# Patient Record
Sex: FEMALE | Race: WHITE | NOT HISPANIC OR LATINO | Employment: FULL TIME | URBAN - METROPOLITAN AREA
[De-identification: names, ages, dates, MRNs, and addresses within clinical notes are randomized per-mention and may not be internally consistent; named-entity substitution may affect disease eponyms.]

---

## 2022-11-30 ENCOUNTER — HOSPITAL ENCOUNTER (EMERGENCY)
Facility: CLINIC | Age: 27
Discharge: HOME | End: 2022-11-30
Attending: PHYSICIAN ASSISTANT | Admitting: PHYSICIAN ASSISTANT
Payer: COMMERCIAL

## 2022-11-30 VITALS
SYSTOLIC BLOOD PRESSURE: 124 MMHG | HEART RATE: 72 BPM | RESPIRATION RATE: 16 BRPM | TEMPERATURE: 97.4 F | DIASTOLIC BLOOD PRESSURE: 78 MMHG | OXYGEN SATURATION: 100 %

## 2022-11-30 DIAGNOSIS — J06.9 UPPER RESPIRATORY TRACT INFECTION, UNSPECIFIED TYPE: ICD-10-CM

## 2022-11-30 LAB
FLUAV RNA SPEC QL NAA+PROBE: NEGATIVE
FLUBV RNA RESP QL NAA+PROBE: NEGATIVE
RSV RNA SPEC NAA+PROBE: NEGATIVE
SARS-COV-2 RNA RESP QL NAA+PROBE: NEGATIVE

## 2022-11-30 PROCEDURE — C9803 HOPD COVID-19 SPEC COLLECT: HCPCS

## 2022-11-30 PROCEDURE — 87637 SARSCOV2&INF A&B&RSV AMP PRB: CPT | Performed by: PHYSICIAN ASSISTANT

## 2022-11-30 PROCEDURE — 99283 EMERGENCY DEPT VISIT LOW MDM: CPT | Mod: CS

## 2022-11-30 PROCEDURE — 250N000013 HC RX MED GY IP 250 OP 250 PS 637: Performed by: PHYSICIAN ASSISTANT

## 2022-11-30 RX ORDER — ACETAMINOPHEN 500 MG
1000 TABLET ORAL EVERY 4 HOURS PRN
Status: DISCONTINUED | OUTPATIENT
Start: 2022-11-30 | End: 2022-12-01 | Stop reason: HOSPADM

## 2022-11-30 RX ADMIN — ACETAMINOPHEN 1000 MG: 500 TABLET ORAL at 22:55

## 2022-11-30 ASSESSMENT — ENCOUNTER SYMPTOMS
HEADACHES: 1
COUGH: 1
MYALGIAS: 1
FEVER: 1
APPETITE CHANGE: 1

## 2022-11-30 ASSESSMENT — ACTIVITIES OF DAILY LIVING (ADL): ADLS_ACUITY_SCORE: 35

## 2022-11-30 NOTE — Clinical Note
Alycia Noe was seen and treated in our emergency department on 11/30/2022.  She may return to work on 12/02/2022.       If you have any questions or concerns, please don't hesitate to call.      Noreen Wilson PA-C

## 2022-12-01 NOTE — DISCHARGE INSTRUCTIONS

## 2022-12-01 NOTE — ED PROVIDER NOTES
History     Chief Complaint:  Flu Symptoms       HPI   Alycia Noe is a 27 year old female with no significant PMH who presents to ED for evaluation of fever, cough, body aches, headache, loss of smell and voice. Symptoms started yesterday. Patient denies sick contacts. She has not taken any medication before coming to ED. Patient is eating less than usual but is drinking fluids and using bathroom regularly. Patient is not vaccinated against Covid19 or influenza. No vomiting, diarrhea, or rash.    ROS:  Review of Systems   Constitutional: Positive for appetite change and fever.   HENT:        Anosmia   Respiratory: Positive for cough.    Musculoskeletal: Positive for myalgias.   Neurological: Positive for headaches.   All other systems reviewed and are negative.    Allergies:  No Known Allergies     Medications:    No current outpatient medications on file.      Past Medical History:    History reviewed. No pertinent past medical history.    Past Surgical History:    History reviewed. No pertinent surgical history.     Family History:    family history is not on file.    Social History:     PCP: No primary care provider on file.     Physical Exam     Patient Vitals for the past 24 hrs:   BP Temp Temp src Pulse Resp SpO2   11/30/22 2148 (!) 125/94 -- -- 71 -- 100 %   11/30/22 2035 115/84 97.4  F (36.3  C) Temporal 72 16 100 %        Physical Exam  Constitutional: Alert, attentive, GCS 15  HENT:    Nose: Nasal congestion.   Mouth/Throat: Oropharynx is clear, mucous membranes are moist   Eyes: EOM are normal.   CV: regular rate and rhythm; no murmurs, rubs or gallups  Chest: Effort normal and breath sounds normal. No wheezing or crackles.  GI:  There is no tenderness. No distension. Normal bowel sounds  MSK: Normal range of motion.   Neurological: Alert, attentive  Skin: Skin is warm and dry.      Emergency Department Course     Laboratory:  Labs Ordered and Resulted from Time of ED Arrival to Time of ED Departure    INFLUENZA A/B & SARS-COV2 PCR MULTIPLEX - Normal       Result Value    Influenza A PCR Negative      Influenza B PCR Negative      RSV PCR Negative      SARS CoV2 PCR Negative          Emergency Department Course:    Reviewed:  I reviewed nursing notes, vitals, past medical history and [facility]    Assessments:  2235 I obtained history and examined the patient as noted above. Laboratory results discussed.    Interventions:  Medications   acetaminophen (TYLENOL) tablet 1,000 mg (has no administration in time range)        Disposition:  The patient was discharged to home.     Impression & Plan    CMS Diagnoses: None  Medical Decision Making:  Patient is a 28 yo F who presents for evaluation of fever, cough, body aches, nasal congestion, and anosmia which started yesterday. Viral swab is negative. Her symptoms are consistent with an upper respiratory tract infection. There are no signs at this point of other serious bacterial infection such as OM, RPA, epiglottitis, PTA, strep pharyngitis, pneumonia, sinusitis, meningitis, bacteremia. Given clear lungs, fever curve, no hypoxia and no respiratory distress I do not feel patient needs a CXR at this point as the probability of bacterial pneumonia is very unlikely. There are no concerning gastrointestinal symptoms at this point and no signs of dehydration. Close followup with primary care physician is indicated. Return to UR/ED for fever > 103, protracted vomiting, confusion. Patient expressed understanding of plan and was ready for discharge.    Diagnosis:    ICD-10-CM    1. Upper respiratory tract infection, unspecified type  J06.9            Discharge Medications:  New Prescriptions    No medications on file        11/30/2022   Noreen Wilson PA-C Steinbrueck, Emily, PA-C  11/30/22 3650

## 2022-12-01 NOTE — ED TRIAGE NOTES
Pt presents with fever, cough, body aches, headaches, loss of smell and voice. Sx started yesterday.      Triage Assessment     Row Name 11/30/22 2034       Triage Assessment (Adult)    Airway WDL WDL       Respiratory WDL    Respiratory WDL WDL       Skin Circulation/Temperature WDL    Skin Circulation/Temperature WDL WDL       Cardiac WDL    Cardiac WDL WDL       Peripheral/Neurovascular WDL    Peripheral Neurovascular WDL WDL       Cognitive/Neuro/Behavioral WDL    Cognitive/Neuro/Behavioral WDL WDL

## 2023-06-14 ENCOUNTER — HOSPITAL ENCOUNTER (EMERGENCY)
Facility: CLINIC | Age: 28
Discharge: HOME | End: 2023-06-14
Attending: EMERGENCY MEDICINE | Admitting: EMERGENCY MEDICINE
Payer: COMMERCIAL

## 2023-06-14 VITALS
RESPIRATION RATE: 18 BRPM | HEIGHT: 66 IN | BODY MASS INDEX: 31.43 KG/M2 | SYSTOLIC BLOOD PRESSURE: 121 MMHG | HEART RATE: 86 BPM | OXYGEN SATURATION: 100 % | TEMPERATURE: 98 F | WEIGHT: 195.6 LBS | DIASTOLIC BLOOD PRESSURE: 79 MMHG

## 2023-06-14 DIAGNOSIS — G44.219 EPISODIC TENSION-TYPE HEADACHE, NOT INTRACTABLE: ICD-10-CM

## 2023-06-14 LAB — GLUCOSE BLDC GLUCOMTR-MCNC: 90 MG/DL (ref 70–99)

## 2023-06-14 PROCEDURE — 82962 GLUCOSE BLOOD TEST: CPT

## 2023-06-14 PROCEDURE — 99283 EMERGENCY DEPT VISIT LOW MDM: CPT | Performed by: EMERGENCY MEDICINE

## 2023-06-14 PROCEDURE — 250N000013 HC RX MED GY IP 250 OP 250 PS 637: Performed by: EMERGENCY MEDICINE

## 2023-06-14 RX ORDER — IBUPROFEN 600 MG/1
600 TABLET, FILM COATED ORAL ONCE
Status: COMPLETED | OUTPATIENT
Start: 2023-06-14 | End: 2023-06-14

## 2023-06-14 RX ADMIN — IBUPROFEN 600 MG: 600 TABLET, FILM COATED ORAL at 05:42

## 2023-06-14 NOTE — ED TRIAGE NOTES
"Patient sad she has been having intermittent headache for almost a month now. She said headache was getting worse that she was unable to sleep.  She also reported \"slight\" lightheadedness. Patient took Tylenol 500 mg around 0351.     Triage Assessment     Row Name 06/14/23 8651       Triage Assessment (Adult)    Airway WDL WDL       Respiratory WDL    Respiratory WDL WDL       Skin Circulation/Temperature WDL    Skin Circulation/Temperature WDL WDL       Cardiac WDL    Cardiac WDL WDL       Peripheral/Neurovascular WDL    Peripheral Neurovascular WDL WDL       Cognitive/Neuro/Behavioral WDL    Cognitive/Neuro/Behavioral WDL WDL              "

## 2023-06-14 NOTE — DISCHARGE INSTRUCTIONS
Please make an appointment to [facility] with [facility] (phone: [phone]) in 2-3 days if you have any concerns.    Return to the emergency department if you have any further concerns    If Alycia has discomfort from fever or other pain, she can have:  Acetaminophen (Tylenol) every 6 hours as needed. Her dose is:    2 regular strength tabs (650 mg)                                     (43.2+ kg/96+ lb)    NOTE: If your acetaminophen (Tylenol) came with a dropper marked with 0.4 and 0.8 ml, call us ([phone]) or check with your doctor about the dose before using it.     AND/OR      Ibuprofen (Advil, Motrin) every 6 hours as needed. His/her dose is:    2 regular strength tabs (400 mg)                                                                         (40-60 kg/ lb)

## 2023-06-14 NOTE — ED PROVIDER NOTES
"ED Provider Note  [facility]      History     Chief Complaint   Patient presents with     Headache     HPI  Alycia Noe is a 28 year old female who is presenting with a headache.  She has had every week for the past month.  Changes from the left to the right side.  No trauma.  She is not on blood thinners.  Denies numbness tingling or weakness.  No visual changes.  She has had a little dizziness with the headaches but nothing right now.  No vertigo.  No feeling as if she will pass out.  She has not had trouble ambulating.  No vomiting or visual changes.  Denies neck pain or stiffness.  No fevers or chills.  She is tried Tylenol which does help.  She is wondering if she has low blood sugar.  Headache does not change with position.  It is not worse in the mornings.    Past Medical History  History reviewed. No pertinent past medical history.  History reviewed. No pertinent surgical history.  No current outpatient medications on file.    No Known Allergies  Family History  History reviewed. No pertinent family history.  Social History   Social History     Tobacco Use     Smoking status: Never     Passive exposure: Never     Smokeless tobacco: Never         A medically appropriate review of systems was performed with pertinent positives and negatives noted in the HPI, and all other systems negative.    Physical Exam   BP: 121/79  Pulse: 86  Temp: 98  F (36.7  C)  Resp: 18  Height: 167.6 cm (5' 6\")  Weight: 88.7 kg (195 lb 9.6 oz)  SpO2: 100 %  Physical Exam  Physical Exam   Constitutional: oriented to person, place, and time. appears well-developed and well-nourished.   HENT:   Head: Normocephalic and atraumatic. No tenderness over the temples or cranium.  Neck: Normal range of motion. No tenderness over the C-spine.  Pulmonary/Chest: Effort normal. No respiratory distress.   Cardiac: No murmurs, rubs, gallops. RRR.  Abdominal: Abdomen soft, nontender, nondistended. No rebound " tenderness.  MSK: Long bones without deformity or evidence of trauma  Neurological: alert and oriented to person, place, and time. No nuchal rigidity.  Stable gait.  No focal defects.  Cranial nerves II through XII normal, pupils 3 mm bilaterally.  , bicep, tricep, lower leg and hip strength symmetric.  Sensation gross intact light touch in all extremities.  Skin: Skin is warm and dry.   Psychiatric:  normal mood and affect.  behavior is normal. Thought content normal.       ED Course, Procedures, & Data      Procedures              No results found for any visits on 06/14/23.  Medications   ibuprofen (ADVIL/MOTRIN) tablet 600 mg (has no administration in time range)     Labs Ordered and Resulted from Time of ED Arrival to Time of ED Departure - No data to display  No orders to display          Critical care was not performed.     Medical Decision Making  The patient's presentation was of low complexity (an acute and uncomplicated illness or injury).    The patient's evaluation involved:  ordering and/or review of 1 test(s) in this encounter (see separate area of note for details)    The patient's management necessitated only low risk treatment.      Assessment & Plan    MDM  Patient presenting with occasional headache over the past month.  There are no red flags to suggest mass she has no vomiting, visual changes, no worsening with position change and it is not worse in the mornings.  Symptoms are resolved with Tylenol.  No signs or symptoms of meningitis encephalitis today.  No concern for intracranial hemorrhage, will defer imaging at this point.  Do not feel labs are necessary.  Blood sugar is normal.  Recommended conservative treatment with NSAIDs, Tylenol and follow-up with her primary care provider, she was provided a phone number for this.    I have reviewed the nursing notes. I have reviewed the findings, diagnosis, plan and need for follow up with the patient.    New Prescriptions    No medications on  file       Final diagnoses:   Episodic tension-type headache, not intractable       Elías ZIMMERMAN [facility] EMERGENCY DEPARTMENT  6/14/2023     Elías Breaux MD  06/14/23 0551

## 2023-07-10 ENCOUNTER — APPOINTMENT (OUTPATIENT)
Dept: CT IMAGING | Facility: CLINIC | Age: 28
End: 2023-07-10
Attending: EMERGENCY MEDICINE
Payer: COMMERCIAL

## 2023-07-10 ENCOUNTER — HOSPITAL ENCOUNTER (EMERGENCY)
Facility: CLINIC | Age: 28
Discharge: HOME | End: 2023-07-10
Attending: EMERGENCY MEDICINE | Admitting: EMERGENCY MEDICINE
Payer: COMMERCIAL

## 2023-07-10 VITALS
WEIGHT: 155 LBS | HEART RATE: 83 BPM | TEMPERATURE: 97.8 F | BODY MASS INDEX: 25.02 KG/M2 | OXYGEN SATURATION: 100 % | SYSTOLIC BLOOD PRESSURE: 97 MMHG | RESPIRATION RATE: 16 BRPM | DIASTOLIC BLOOD PRESSURE: 52 MMHG

## 2023-07-10 DIAGNOSIS — M54.2 NECK PAIN: ICD-10-CM

## 2023-07-10 DIAGNOSIS — R51.9 NONINTRACTABLE EPISODIC HEADACHE, UNSPECIFIED HEADACHE TYPE: ICD-10-CM

## 2023-07-10 LAB — HCG UR QL: NEGATIVE

## 2023-07-10 PROCEDURE — 70450 CT HEAD/BRAIN W/O DYE: CPT

## 2023-07-10 PROCEDURE — 250N000013 HC RX MED GY IP 250 OP 250 PS 637: Performed by: EMERGENCY MEDICINE

## 2023-07-10 PROCEDURE — 99284 EMERGENCY DEPT VISIT MOD MDM: CPT | Performed by: EMERGENCY MEDICINE

## 2023-07-10 PROCEDURE — 99284 EMERGENCY DEPT VISIT MOD MDM: CPT | Mod: 25 | Performed by: EMERGENCY MEDICINE

## 2023-07-10 PROCEDURE — 81025 URINE PREGNANCY TEST: CPT | Performed by: EMERGENCY MEDICINE

## 2023-07-10 RX ORDER — IBUPROFEN 600 MG/1
600 TABLET, FILM COATED ORAL ONCE
Status: COMPLETED | OUTPATIENT
Start: 2023-07-10 | End: 2023-07-10

## 2023-07-10 RX ADMIN — IBUPROFEN 600 MG: 600 TABLET, FILM COATED ORAL at 20:10

## 2023-07-10 ASSESSMENT — ACTIVITIES OF DAILY LIVING (ADL)
ADLS_ACUITY_SCORE: 35
ADLS_ACUITY_SCORE: 35
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
## 2023-07-10 NOTE — LETTER
July 10, 2023      To Whom It May Concern:      Alycia Noe was seen in our Emergency Department today, 07/10/23.  She may return to work 7/12/23.    Sincerely,        CRISTO JIMÉNEZ MD, MD

## 2023-07-11 NOTE — DISCHARGE INSTRUCTIONS
Take ibuprofen 600 mg every 6 hours with food.  Perform gentle stretching of your neck muscles.  Apply warm packs as needed.    Follow-up with your primary care clinic in 1 week if still experiencing symptoms.
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
## 2024-01-30 ENCOUNTER — MEDICAL CORRESPONDENCE (OUTPATIENT)
Dept: HEALTH INFORMATION MANAGEMENT | Facility: CLINIC | Age: 29
End: 2024-01-30

## 2024-05-26 ENCOUNTER — OFFICE VISIT (OUTPATIENT)
Dept: URGENT CARE | Facility: URGENT CARE | Age: 29
End: 2024-05-26
Payer: COMMERCIAL

## 2024-05-26 VITALS
HEART RATE: 72 BPM | DIASTOLIC BLOOD PRESSURE: 74 MMHG | SYSTOLIC BLOOD PRESSURE: 112 MMHG | OXYGEN SATURATION: 100 % | BODY MASS INDEX: 28.89 KG/M2 | TEMPERATURE: 98.3 F | WEIGHT: 179 LBS | RESPIRATION RATE: 16 BRPM

## 2024-05-26 DIAGNOSIS — D50.8 OTHER IRON DEFICIENCY ANEMIA: ICD-10-CM

## 2024-05-26 DIAGNOSIS — R73.9 BLOOD GLUCOSE ELEVATED: ICD-10-CM

## 2024-05-26 DIAGNOSIS — N39.0 ACUTE UTI: Primary | ICD-10-CM

## 2024-05-26 DIAGNOSIS — R42 DIZZINESS: ICD-10-CM

## 2024-05-26 DIAGNOSIS — R30.0 DYSURIA: ICD-10-CM

## 2024-05-26 LAB
ALBUMIN UR-MCNC: ABNORMAL MG/DL
ANION GAP SERPL CALCULATED.3IONS-SCNC: 2 MMOL/L (ref 3–14)
APPEARANCE UR: CLEAR
BACTERIA #/AREA URNS HPF: ABNORMAL /HPF
BASOPHILS # BLD AUTO: 0 10E3/UL (ref 0–0.2)
BASOPHILS NFR BLD AUTO: 0 %
BILIRUB UR QL STRIP: NEGATIVE
BUN SERPL-MCNC: 10 MG/DL (ref 7–30)
CALCIUM SERPL-MCNC: 9.6 MG/DL (ref 8.5–10.1)
CHLORIDE BLD-SCNC: 109 MMOL/L (ref 94–109)
CLUE CELLS: NORMAL
CO2 SERPL-SCNC: 28 MMOL/L (ref 20–32)
COLOR UR AUTO: YELLOW
CREAT SERPL-MCNC: 0.7 MG/DL (ref 0.52–1.04)
EGFRCR SERPLBLD CKD-EPI 2021: >90 ML/MIN/1.73M2
EOSINOPHIL # BLD AUTO: 0 10E3/UL (ref 0–0.7)
EOSINOPHIL NFR BLD AUTO: 0 %
ERYTHROCYTE [DISTWIDTH] IN BLOOD BY AUTOMATED COUNT: 19 % (ref 10–15)
GLUCOSE BLD-MCNC: 95 MG/DL (ref 70–99)
GLUCOSE UR STRIP-MCNC: NEGATIVE MG/DL
HCT VFR BLD AUTO: 33.3 % (ref 35–47)
HGB BLD-MCNC: 10.2 G/DL (ref 11.7–15.7)
HGB UR QL STRIP: ABNORMAL
IMM GRANULOCYTES # BLD: 0 10E3/UL
IMM GRANULOCYTES NFR BLD: 0 %
KETONES UR STRIP-MCNC: 15 MG/DL
LEUKOCYTE ESTERASE UR QL STRIP: ABNORMAL
LYMPHOCYTES # BLD AUTO: 3.3 10E3/UL (ref 0.8–5.3)
LYMPHOCYTES NFR BLD AUTO: 34 %
MCH RBC QN AUTO: 21.5 PG (ref 26.5–33)
MCHC RBC AUTO-ENTMCNC: 30.6 G/DL (ref 31.5–36.5)
MCV RBC AUTO: 70 FL (ref 78–100)
MONOCYTES # BLD AUTO: 0.9 10E3/UL (ref 0–1.3)
MONOCYTES NFR BLD AUTO: 9 %
NEUTROPHILS # BLD AUTO: 5.6 10E3/UL (ref 1.6–8.3)
NEUTROPHILS NFR BLD AUTO: 57 %
NITRATE UR QL: NEGATIVE
PH UR STRIP: 6 [PH] (ref 5–7)
PLATELET # BLD AUTO: 272 10E3/UL (ref 150–450)
POTASSIUM BLD-SCNC: 3.6 MMOL/L (ref 3.4–5.3)
RBC # BLD AUTO: 4.74 10E6/UL (ref 3.8–5.2)
RBC #/AREA URNS AUTO: ABNORMAL /HPF
SODIUM SERPL-SCNC: 139 MMOL/L (ref 135–145)
SP GR UR STRIP: >=1.03 (ref 1–1.03)
SQUAMOUS #/AREA URNS AUTO: ABNORMAL /LPF
TRICHOMONAS, WET PREP: NORMAL
UROBILINOGEN UR STRIP-ACNC: 0.2 E.U./DL
WBC # BLD AUTO: 9.8 10E3/UL (ref 4–11)
WBC #/AREA URNS AUTO: ABNORMAL /HPF
WBC'S/HIGH POWER FIELD, WET PREP: NORMAL
YEAST, WET PREP: NORMAL

## 2024-05-26 PROCEDURE — 81001 URINALYSIS AUTO W/SCOPE: CPT | Performed by: PHYSICIAN ASSISTANT

## 2024-05-26 PROCEDURE — 80048 BASIC METABOLIC PNL TOTAL CA: CPT | Performed by: PHYSICIAN ASSISTANT

## 2024-05-26 PROCEDURE — 99204 OFFICE O/P NEW MOD 45 MIN: CPT | Performed by: PHYSICIAN ASSISTANT

## 2024-05-26 PROCEDURE — 36415 COLL VENOUS BLD VENIPUNCTURE: CPT | Performed by: PHYSICIAN ASSISTANT

## 2024-05-26 PROCEDURE — 87210 SMEAR WET MOUNT SALINE/INK: CPT | Performed by: PHYSICIAN ASSISTANT

## 2024-05-26 PROCEDURE — 85025 COMPLETE CBC W/AUTO DIFF WBC: CPT | Performed by: PHYSICIAN ASSISTANT

## 2024-05-26 PROCEDURE — 87088 URINE BACTERIA CULTURE: CPT | Performed by: PHYSICIAN ASSISTANT

## 2024-05-26 PROCEDURE — 87086 URINE CULTURE/COLONY COUNT: CPT | Performed by: PHYSICIAN ASSISTANT

## 2024-05-26 RX ORDER — CEFUROXIME AXETIL 500 MG/1
500 TABLET ORAL 2 TIMES DAILY
Qty: 14 TABLET | Refills: 0 | Status: SHIPPED | OUTPATIENT
Start: 2024-05-26 | End: 2024-06-02

## 2024-05-26 RX ORDER — FLUCONAZOLE 150 MG/1
TABLET ORAL
Qty: 1 TABLET | Refills: 0 | Status: SHIPPED | OUTPATIENT
Start: 2024-05-26

## 2024-05-26 NOTE — PATIENT INSTRUCTIONS
(N39.0) Acute UTI  (primary encounter diagnosis)  Comment:   Plan: cefuroxime (CEFTIN) 500 MG tablet, fluconazole         (DIFLUCAN) 150 MG tablet          Take Diflucan after finishing antibiotic    (R73.9) Blood glucose elevated  Comment: There is no glucose in your urine today which is a good sign.  Plan: CBC with platelets and differential, Basic         metabolic panel  (Ca, Cl, CO2, Creat, Gluc, K,         Na, BUN)    Chemistry panel is still pending.  I will contact you if the glucose is abnormally elevated.            (R30.0) Dysuria  Comment:   Plan: UA Macroscopic with reflex to Microscopic and         Culture - Clinic Collect, Wet prep - Clinic         Collect, UA Microscopic with Reflex to Culture,        Urine Culture Aerobic Bacterial - lab collect            (R42) Dizziness  Comment: May be secondary to urinary tract infection and mild underlying anemia    (D50.8) Other anemia  Comment:   Plan: Take multivitamin with iron daily.    Establish care with primary doctor and follow-up within the next month for reevaluation.

## 2024-05-26 NOTE — PROGRESS NOTES
Patient presents with:  Dizziness: Feeling dizzy x 1 week. Pt wants to get check for diabetes she took a test at her job and her glucose number were High.         (N39.0) Acute UTI  (primary encounter diagnosis)  Comment:   Plan: cefuroxime (CEFTIN) 500 MG tablet, fluconazole         (DIFLUCAN) 150 MG tablet          Take Diflucan after finishing antibiotic    (R73.9) Blood glucose elevated  Comment: There is no glucose in your urine today which is a good sign.  Plan: CBC with platelets and differential, Basic         metabolic panel  (Ca, Cl, CO2, Creat, Gluc, K,         Na, BUN)    Chemistry panel is still pending.  I will contact you if the glucose is abnormally elevated.            (R30.0) Dysuria  Comment:   Plan: UA Macroscopic with reflex to Microscopic and         Culture - Clinic Collect, Wet prep - Clinic         Collect, UA Microscopic with Reflex to Culture,        Urine Culture Aerobic Bacterial - lab collect            (R42) Dizziness  Comment: May be secondary to urinary tract infection and mild underlying anemia    (D50.8) Other anemia  Comment:   Plan: Take multivitamin with iron daily.    Establish care with primary doctor and follow-up within the next month for reevaluation.      At the end of the encounter, I discussed results, diagnosis, medications. Discussed red flags for immediate return to clinic/ER, as well as indications for follow up if no improvement. Patient understood and agreed to plan. Patient was stable for discharge     SUBJECTIVE:   Alycia Noe is a 29 year old female who presents today with some burning with urination for the past week and slight dizziness.  She had her blood sugar checked at a health screening at her work, and it was noted to be high.  She would like her sugar checked.    She is here today with her mother who is also being seen.  She is translating for her.    Social history: Patient moved to the  from [facility] with her paternal grand mother when she was  young.  She was raised by her paternal grandmother.  Her parents  when she was young and her father gave her to the grandmother.  She was under the impression that her mother had passed away.  It is only in recent years that she found out that she is alive and well.  Her mother moved from [facility] to the [place] in the past year and is now here with her.      Current Outpatient Medications   Medication Sig Dispense Refill    Multiple Vitamins-Iron (DAILY-VADIM/IRON/BETA-CAROTENE) TABS TAKE 1 TABLET BY MOUTH DAILY. (Patient not taking: Reported on 10/19/2020) 30 tablet 7     Social History     Tobacco Use    Smoking status: Never Smoker    Smokeless tobacco: Never Used   Substance Use Topics    Alcohol use: Not on file     Family History   Problem Relation Age of Onset    Diabetes Mother     Diabetes Father          ROS:    10 point ROS of systems including Constitutional, Eyes, Respiratory, Cardiovascular, Gastroenterology, Genitourinary, Integumentary, Muscularskeletal, Psychiatric ,neurological were all negative except for pertinent positives noted in my HPI       OBJECTIVE:  /74 (BP Location: Right arm, Patient Position: Chair, Cuff Size: Adult Regular)   Pulse 72   Temp 98.3  F (36.8  C) (Tympanic)   Resp 16   Wt 81.2 kg (179 lb)   LMP 05/23/2024   SpO2 100%   BMI 28.89 kg/m    Physical Exam:  GENERAL APPEARANCE: healthy, alert and no distress  EYES: EOMI,  PERRL, conjunctiva clear  HENT: ear canals and TM's normal.  Nose and mouth without ulcers, erythema or lesions  NECK: supple, nontender, no lymphadenopathy  RESP: lungs clear to auscultation - no rales, rhonchi or wheezes  CV: regular rates and rhythm, normal S1 S2, no murmur noted  ABDOMEN:  soft, nontender, no HSM or masses and bowel sounds normal  NEURO: Normal strength and tone, sensory exam grossly normal,  normal speech and mentation  SKIN: no suspicious lesions or rashes    Results for orders placed or performed in visit on  05/26/24   Basic metabolic panel  (Ca, Cl, CO2, Creat, Gluc, K, Na, BUN)     Status: Abnormal   Result Value Ref Range    Sodium 139 135 - 145 mmol/L    Potassium 3.6 3.4 - 5.3 mmol/L    Chloride 109 94 - 109 mmol/L    Carbon Dioxide (CO2) 28 20 - 32 mmol/L    Anion Gap 2 (L) 3 - 14 mmol/L    Urea Nitrogen 10 7 - 30 mg/dL    Creatinine 0.70 0.52 - 1.04 mg/dL    GFR Estimate >90 >60 mL/min/1.73m2    Calcium 9.6 8.5 - 10.1 mg/dL    Glucose 95 70 - 99 mg/dL   UA Macroscopic with reflex to Microscopic and Culture - Clinic Collect     Status: Abnormal    Specimen: Urine, Clean Catch   Result Value Ref Range    Color Urine Yellow Colorless, Straw, Light Yellow, Yellow    Appearance Urine Clear Clear    Glucose Urine Negative Negative mg/dL    Bilirubin Urine Negative Negative    Ketones Urine 15 (A) Negative mg/dL    Specific Gravity Urine >=1.030 1.003 - 1.035    Blood Urine Large (A) Negative    pH Urine 6.0 5.0 - 7.0    Protein Albumin Urine Trace (A) Negative mg/dL    Urobilinogen Urine 0.2 0.2, 1.0 E.U./dL    Nitrite Urine Negative Negative    Leukocyte Esterase Urine Trace (A) Negative   CBC with platelets and differential     Status: Abnormal   Result Value Ref Range    WBC Count 9.8 4.0 - 11.0 10e3/uL    RBC Count 4.74 3.80 - 5.20 10e6/uL    Hemoglobin 10.2 (L) 11.7 - 15.7 g/dL    Hematocrit 33.3 (L) 35.0 - 47.0 %    MCV 70 (L) 78 - 100 fL    MCH 21.5 (L) 26.5 - 33.0 pg    MCHC 30.6 (L) 31.5 - 36.5 g/dL    RDW 19.0 (H) 10.0 - 15.0 %    Platelet Count 272 150 - 450 10e3/uL    % Neutrophils 57 %    % Lymphocytes 34 %    % Monocytes 9 %    % Eosinophils 0 %    % Basophils 0 %    % Immature Granulocytes 0 %    Absolute Neutrophils 5.6 1.6 - 8.3 10e3/uL    Absolute Lymphocytes 3.3 0.8 - 5.3 10e3/uL    Absolute Monocytes 0.9 0.0 - 1.3 10e3/uL    Absolute Eosinophils 0.0 0.0 - 0.7 10e3/uL    Absolute Basophils 0.0 0.0 - 0.2 10e3/uL    Absolute Immature Granulocytes 0.0 <=0.4 10e3/uL   UA Microscopic with Reflex to  Culture     Status: Abnormal   Result Value Ref Range    Bacteria Urine Moderate (A) None Seen /HPF    RBC Urine  (A) 0-2 /HPF /HPF    WBC Urine 5-10 (A) 0-5 /HPF /HPF    Squamous Epithelials Urine Moderate (A) None Seen /LPF    Narrative    Urine Culture not indicated   Wet prep - Clinic Collect     Status: Normal    Specimen: Vagina; Swab   Result Value Ref Range    Trichomonas Absent Absent    Yeast Absent Absent    Clue Cells Absent Absent    WBCs/high power field None None   CBC with platelets and differential     Status: Abnormal    Narrative    The following orders were created for panel order CBC with platelets and differential.  Procedure                               Abnormality         Status                     ---------                               -----------         ------                     CBC with platelets and d...[[id number]]  Abnormal            Final result                 Please view results for these tests on the individual orders.

## 2024-05-28 LAB
BACTERIA UR CULT: ABNORMAL
BACTERIA UR CULT: ABNORMAL

## 2024-12-04 ENCOUNTER — OFFICE VISIT (OUTPATIENT)
Dept: URGENT CARE | Facility: URGENT CARE | Age: 29
End: 2024-12-04
Payer: COMMERCIAL

## 2024-12-04 VITALS
BODY MASS INDEX: 29.7 KG/M2 | TEMPERATURE: 98.9 F | DIASTOLIC BLOOD PRESSURE: 81 MMHG | SYSTOLIC BLOOD PRESSURE: 114 MMHG | WEIGHT: 184 LBS | OXYGEN SATURATION: 100 % | RESPIRATION RATE: 20 BRPM | HEART RATE: 84 BPM

## 2024-12-04 DIAGNOSIS — J03.90 TONSILLITIS: ICD-10-CM

## 2024-12-04 DIAGNOSIS — R07.0 THROAT PAIN: Primary | ICD-10-CM

## 2024-12-04 LAB
ALBUMIN SERPL-MCNC: 3.5 G/DL (ref 3.4–5)
ALP SERPL-CCNC: 47 U/L (ref 40–150)
ALT SERPL W P-5'-P-CCNC: 17 U/L (ref 0–50)
ANION GAP SERPL CALCULATED.3IONS-SCNC: 2 MMOL/L (ref 3–14)
AST SERPL W P-5'-P-CCNC: 20 U/L (ref 0–45)
BASOPHILS # BLD AUTO: 0 10E3/UL (ref 0–0.2)
BASOPHILS NFR BLD AUTO: 0 %
BILIRUB SERPL-MCNC: 0.6 MG/DL (ref 0.2–1.3)
BUN SERPL-MCNC: 12 MG/DL (ref 7–30)
CALCIUM SERPL-MCNC: 9.1 MG/DL (ref 8.5–10.1)
CHLORIDE BLD-SCNC: 108 MMOL/L (ref 94–109)
CO2 SERPL-SCNC: 28 MMOL/L (ref 20–32)
CREAT SERPL-MCNC: 0.7 MG/DL (ref 0.52–1.04)
DEPRECATED S PYO AG THROAT QL EIA: NEGATIVE
EGFRCR SERPLBLD CKD-EPI 2021: >90 ML/MIN/1.73M2
EOSINOPHIL # BLD AUTO: 0.1 10E3/UL (ref 0–0.7)
EOSINOPHIL NFR BLD AUTO: 1 %
ERYTHROCYTE [DISTWIDTH] IN BLOOD BY AUTOMATED COUNT: 22 % (ref 10–15)
GLUCOSE BLD-MCNC: 97 MG/DL (ref 70–99)
GROUP A STREP BY PCR: NOT DETECTED
HCT VFR BLD AUTO: 35.1 % (ref 35–47)
HGB BLD-MCNC: 11.3 G/DL (ref 11.7–15.7)
IMM GRANULOCYTES # BLD: 0 10E3/UL
IMM GRANULOCYTES NFR BLD: 0 %
LYMPHOCYTES # BLD AUTO: 1.9 10E3/UL (ref 0.8–5.3)
LYMPHOCYTES NFR BLD AUTO: 34 %
MCH RBC QN AUTO: 25.9 PG (ref 26.5–33)
MCHC RBC AUTO-ENTMCNC: 32.2 G/DL (ref 31.5–36.5)
MCV RBC AUTO: 81 FL (ref 78–100)
MONOCYTES # BLD AUTO: 0.3 10E3/UL (ref 0–1.3)
MONOCYTES NFR BLD AUTO: 6 %
NEUTROPHILS # BLD AUTO: 3.4 10E3/UL (ref 1.6–8.3)
NEUTROPHILS NFR BLD AUTO: 59 %
PLATELET # BLD AUTO: 287 10E3/UL (ref 150–450)
POTASSIUM BLD-SCNC: 4.7 MMOL/L (ref 3.4–5.3)
PROT SERPL-MCNC: 7.6 G/DL (ref 6.8–8.8)
RBC # BLD AUTO: 4.36 10E6/UL (ref 3.8–5.2)
SODIUM SERPL-SCNC: 138 MMOL/L (ref 135–145)
VIT B12 SERPL-MCNC: <150 PG/ML (ref 232–1245)
WBC # BLD AUTO: 5.8 10E3/UL (ref 4–11)

## 2024-12-04 PROCEDURE — 85025 COMPLETE CBC W/AUTO DIFF WBC: CPT | Performed by: PHYSICIAN ASSISTANT

## 2024-12-04 PROCEDURE — 99203 OFFICE O/P NEW LOW 30 MIN: CPT | Performed by: PHYSICIAN ASSISTANT

## 2024-12-04 PROCEDURE — 82607 VITAMIN B-12: CPT | Performed by: PHYSICIAN ASSISTANT

## 2024-12-04 PROCEDURE — 87651 STREP A DNA AMP PROBE: CPT | Performed by: PHYSICIAN ASSISTANT

## 2024-12-04 PROCEDURE — 36415 COLL VENOUS BLD VENIPUNCTURE: CPT | Performed by: PHYSICIAN ASSISTANT

## 2024-12-04 PROCEDURE — 80053 COMPREHEN METABOLIC PANEL: CPT | Performed by: PHYSICIAN ASSISTANT

## 2024-12-04 RX ORDER — AMOXICILLIN 500 MG/1
500 CAPSULE ORAL 2 TIMES DAILY
Qty: 20 CAPSULE | Refills: 0 | Status: SHIPPED | OUTPATIENT
Start: 2024-12-04 | End: 2024-12-14

## 2024-12-04 NOTE — PROGRESS NOTES
Throat pain  - Streptococcus A Rapid Screen w/Reflex to PCR - Clinic Collect  - Group A Streptococcus PCR Throat Swab  - CBC with platelets and differential  - Comprehensive metabolic panel (BMP + Alb, Alk Phos, ALT, AST, Total. Bili, TP)  - Vitamin B12  - amoxicillin (AMOXIL) 500 MG capsule; Take 1 capsule (500 mg) by mouth 2 times daily for 10 days.    Tonsillitis  - CBC with platelets and differential  - Comprehensive metabolic panel (BMP + Alb, Alk Phos, ALT, AST, Total. Bili, TP)  - Vitamin B12  - amoxicillin (AMOXIL) 500 MG capsule; Take 1 capsule (500 mg) by mouth 2 times daily for 10 days.      General Tips for All Ages:    Rest and Hydration:  Allow yourself the time to rest and prioritize hydration.  Stay well-hydrated with water, clear broths, and soothing herbal teas.    Symptomatic Relief:  Over-the-counter (OTC) medications can help alleviate symptoms.  Consider non-pharmacological options like a warm saltwater gargle for soothing a sore throat.    For Infants and Children (Under 2 Years):    Nasal Saline Drops:  Use saline drops to clear nasal congestion in infants.  Administer 1-2 drops in each nostril before feeding or bedtime.    Humidifier:  Place a cool-mist humidifier in the room to ease congestion.  Remember to clean the humidifier regularly.  Okay to use [employer]     Acetaminophen (if applicable):  Use acetaminophen for fever and discomfort.  Follow dosing guidelines based on your child's weight.  Avoid aspirin in children to prevent Reye's syndrome.    Contact Pediatrician:  If symptoms persist or worsen, or if your child shows signs of respiratory distress, contact your pediatrician.    For Children (2-12 Years):    Nasal Saline Solution:  Encourage the use of saline nasal spray for congestion relief.  Teach your child to blow their nose gently.    Honey (for those over 1 year):  Use honey to soothe a cough (1-2 teaspoons as needed).  Do not give honey to children under 1 year due to the  risk of botulism.    Acetaminophen or Ibuprofen:  Use acetaminophen or ibuprofen for fever and pain relief.  Follow dosing guidelines based on your child's weight.    Fluid Intake:  Ensure your child drinks warm liquids like herbal teas and broths.  Monitor their fluid intake to keep them hydrated.    For Adolescents and Adults (12 Years and Older):    Decongestants (Pseudoephedrine/Phenylephrine):  Consider OTC decongestants for nasal congestion.  Use with caution if you have hypertension, and avoid prolonged use.    Cough Suppressants (Dextromethorphan):  Choose a cough suppressant for persistent cough.  Avoid in children under 12 years; use honey instead.  Follow package instructions and avoid multiple medications with similar ingredients.    Pain Relievers (Acetaminophen or Ibuprofen):  Use acetaminophen or ibuprofen for pain and fever.  Follow package instructions.  Take ibuprofen with food to minimize stomach upset.    Rest and Isolation:  Prioritize rest and stay at home to prevent spreading the infection.    For All Ages:    Hydration:  Ensure you stay well-hydrated; monitor urine color to gauge hydration.    Hand Hygiene:  Wash hands with soap and water for at least 20 seconds.  Use hand  with at least 60% alcohol if soap is unavailable.    Avoid Tobacco Smoke:  Stay away from tobacco smoke, which can worsen respiratory symptoms.    Seek Medical Attention:  If symptoms worsen or if you experience difficulty breathing, seek medical attention promptly.  Look out for red flag symptoms like persistent high fever, severe headache, or chest pain.    Patient advised to return to clinic for reevaluation (either UC or PCP) if symptoms do not improve in 7 days. Patient educated on red flag symptoms and asked to go directly to the ED if these symptoms present themselves.     Remember, this guide is meant to assist you, but individual cases may vary. If you have concerns or if symptoms persist, don't hesitate  to reach out to a healthcare professional. Wishing you a speedy recovery!      GRACE Connors [facility]    Subjective   29 year old who presents to clinic today for the following health issues:    Pharyngitis       HPI     Acute Illness  Acute illness concerns: Sore throat x 3 days, tongue swollen with no taste x 2 weeks- fatigue as well  Symptoms:  Fever: No  Chills/Sweats: No  Headache (location?): No  Sinus Pressure: No  Conjunctivitis:  No  Ear Pain: no  Rhinorrhea: No  Congestion: No  Sore Throat: YES  Cough: mild  Wheeze: No  Decreased Appetite: No  Nausea: Mild  Vomiting: A week ago but not now   Diarrhea: No  Dysuria/Freq.: No  Dysuria or Hematuria: No  Fatigue/Achiness: No  Sick/Strep Exposure: None   Therapies tried and outcome: Ibuprofen     Review of Systems   Review of Systems   See HPI    Objective    Temp: 98.9  F (37.2  C) Temp src: Tympanic BP: 114/81 Pulse: 84   Resp: 20 SpO2: 100 %       Physical Exam   Physical Exam  Constitutional:       General: She is not in acute distress.     Appearance: Normal appearance. She is normal weight. She is not ill-appearing, toxic-appearing or diaphoretic.   HENT:      Head: Normocephalic and atraumatic.      Right Ear: Tympanic membrane, ear canal and external ear normal. There is no impacted cerumen.      Left Ear: Tympanic membrane, ear canal and external ear normal. There is no impacted cerumen.      Nose: Nose normal. No congestion or rhinorrhea.      Mouth/Throat:      Mouth: Mucous membranes are moist.      Pharynx: Oropharyngeal exudate and posterior oropharyngeal erythema present.   Cardiovascular:      Rate and Rhythm: Normal rate and regular rhythm.      Pulses: Normal pulses.      Heart sounds: Normal heart sounds. No murmur heard.     No friction rub. No gallop.   Pulmonary:      Effort: Pulmonary effort is normal. No respiratory distress.      Breath sounds: No stridor. No wheezing, rhonchi or rales.   Chest:      Chest  wall: No tenderness.   Lymphadenopathy:      Cervical: Cervical adenopathy present.   Neurological:      General: No focal deficit present.      Mental Status: She is alert and oriented to person, place, and time. Mental status is at baseline.      Gait: Gait normal.   Psychiatric:         Mood and Affect: Mood normal.         Behavior: Behavior normal.         Thought Content: Thought content normal.         Judgment: Judgment normal.          Results for orders placed or performed in visit on 12/04/24 (from the past 24 hours)   Streptococcus A Rapid Screen w/Reflex to PCR - Clinic Collect    Specimen: Throat; Swab   Result Value Ref Range    Group A Strep antigen Negative Negative

## 2024-12-11 ENCOUNTER — APPOINTMENT (OUTPATIENT)
Dept: MRI IMAGING | Facility: CLINIC | Age: 29
End: 2024-12-11
Attending: EMERGENCY MEDICINE
Payer: COMMERCIAL

## 2024-12-11 ENCOUNTER — HOSPITAL ENCOUNTER (INPATIENT)
Facility: CLINIC | Age: 29
End: 2024-12-11
Attending: EMERGENCY MEDICINE | Admitting: INTERNAL MEDICINE
Payer: COMMERCIAL

## 2024-12-11 ENCOUNTER — OFFICE VISIT (OUTPATIENT)
Dept: URGENT CARE | Facility: URGENT CARE | Age: 29
End: 2024-12-11
Payer: COMMERCIAL

## 2024-12-11 VITALS
DIASTOLIC BLOOD PRESSURE: 78 MMHG | TEMPERATURE: 98 F | OXYGEN SATURATION: 100 % | RESPIRATION RATE: 18 BRPM | SYSTOLIC BLOOD PRESSURE: 119 MMHG | HEART RATE: 91 BPM

## 2024-12-11 DIAGNOSIS — R29.898 BILATERAL LEG WEAKNESS: Primary | ICD-10-CM

## 2024-12-11 DIAGNOSIS — R20.0 BILATERAL LEG NUMBNESS: ICD-10-CM

## 2024-12-11 DIAGNOSIS — E55.9 VITAMIN D DEFICIENCY: ICD-10-CM

## 2024-12-11 DIAGNOSIS — E60 ZINC DEFICIENCY: ICD-10-CM

## 2024-12-11 DIAGNOSIS — D50.8 OTHER IRON DEFICIENCY ANEMIA: ICD-10-CM

## 2024-12-11 DIAGNOSIS — R29.898 LEG WEAKNESS, BILATERAL: Primary | ICD-10-CM

## 2024-12-11 DIAGNOSIS — R29.898 WEAKNESS OF BOTH LEGS: ICD-10-CM

## 2024-12-11 DIAGNOSIS — E53.8 VITAMIN B12 DEFICIENCY (NON ANEMIC): ICD-10-CM

## 2024-12-11 LAB
ALBUMIN SERPL BCG-MCNC: 4.2 G/DL (ref 3.5–5.2)
ALBUMIN UR-MCNC: NEGATIVE MG/DL
ALP SERPL-CCNC: 60 U/L (ref 40–150)
ALT SERPL W P-5'-P-CCNC: 20 U/L (ref 0–50)
ANION GAP SERPL CALCULATED.3IONS-SCNC: 13 MMOL/L (ref 7–15)
APPEARANCE CSF: CLEAR
APPEARANCE UR: CLEAR
AST SERPL W P-5'-P-CCNC: 19 U/L (ref 0–45)
BACTERIA #/AREA URNS HPF: ABNORMAL /HPF
BASOPHILS # BLD AUTO: 0 10E3/UL (ref 0–0.2)
BASOPHILS NFR BLD AUTO: 0 %
BILIRUB SERPL-MCNC: 0.2 MG/DL
BILIRUB UR QL STRIP: NEGATIVE
BUN SERPL-MCNC: 8.8 MG/DL (ref 6–20)
C GATTII+NEOFOR DNA CSF QL NAA+NON-PROBE: NEGATIVE
CALCIUM SERPL-MCNC: 9.2 MG/DL (ref 8.8–10.4)
CHLORIDE SERPL-SCNC: 100 MMOL/L (ref 98–107)
CMV DNA CSF QL NAA+NON-PROBE: NEGATIVE
COLOR CSF: COLORLESS
COLOR UR AUTO: ABNORMAL
CREAT SERPL-MCNC: 0.61 MG/DL (ref 0.51–0.95)
E COLI K1 AG CSF QL: NEGATIVE
EGFRCR SERPLBLD CKD-EPI 2021: >90 ML/MIN/1.73M2
EOSINOPHIL # BLD AUTO: 0.1 10E3/UL (ref 0–0.7)
EOSINOPHIL NFR BLD AUTO: 2 %
ERYTHROCYTE [DISTWIDTH] IN BLOOD BY AUTOMATED COUNT: 22.6 % (ref 10–15)
EV RNA SPEC QL NAA+PROBE: NEGATIVE
FLUAV RNA SPEC QL NAA+PROBE: NEGATIVE
FLUBV RNA RESP QL NAA+PROBE: NEGATIVE
GLUCOSE CSF-MCNC: 60 MG/DL (ref 40–70)
GLUCOSE SERPL-MCNC: 92 MG/DL (ref 70–99)
GLUCOSE UR STRIP-MCNC: NEGATIVE MG/DL
GP B STREP DNA CSF QL NAA+NON-PROBE: NEGATIVE
HAEM INFLU DNA CSF QL NAA+NON-PROBE: NEGATIVE
HCG UR QL: NEGATIVE
HCO3 SERPL-SCNC: 23 MMOL/L (ref 22–29)
HCT VFR BLD AUTO: 35.6 % (ref 35–47)
HGB BLD-MCNC: 11.2 G/DL (ref 11.7–15.7)
HGB UR QL STRIP: ABNORMAL
HHV6 DNA CSF QL NAA+NON-PROBE: NEGATIVE
HSV1 DNA CSF QL NAA+NON-PROBE: NEGATIVE
HSV2 DNA CSF QL NAA+NON-PROBE: NEGATIVE
IMM GRANULOCYTES # BLD: 0.1 10E3/UL
IMM GRANULOCYTES NFR BLD: 1 %
KETONES UR STRIP-MCNC: NEGATIVE MG/DL
L MONOCYTOG DNA CSF QL NAA+NON-PROBE: NEGATIVE
LEUKOCYTE ESTERASE UR QL STRIP: ABNORMAL
LYMPHOCYTES # BLD AUTO: 2.8 10E3/UL (ref 0.8–5.3)
LYMPHOCYTES NFR BLD AUTO: 34 %
MAGNESIUM SERPL-MCNC: 2 MG/DL (ref 1.7–2.3)
MCH RBC QN AUTO: 25.7 PG (ref 26.5–33)
MCHC RBC AUTO-ENTMCNC: 31.5 G/DL (ref 31.5–36.5)
MCV RBC AUTO: 82 FL (ref 78–100)
MONOCYTES # BLD AUTO: 1 10E3/UL (ref 0–1.3)
MONOCYTES NFR BLD AUTO: 12 %
MUCOUS THREADS #/AREA URNS LPF: PRESENT /LPF
N MEN DNA CSF QL NAA+NON-PROBE: NEGATIVE
NEUTROPHILS # BLD AUTO: 4.2 10E3/UL (ref 1.6–8.3)
NEUTROPHILS NFR BLD AUTO: 51 %
NITRATE UR QL: NEGATIVE
NRBC # BLD AUTO: 0 10E3/UL
NRBC BLD AUTO-RTO: 0 /100
PARECHOVIRUS A RNA CSF QL NAA+NON-PROBE: NEGATIVE
PH UR STRIP: 5.5 [PH] (ref 5–7)
PLATELET # BLD AUTO: 265 10E3/UL (ref 150–450)
POTASSIUM SERPL-SCNC: 4 MMOL/L (ref 3.4–5.3)
PROT CSF-MCNC: 13.7 MG/DL (ref 15–45)
PROT SERPL-MCNC: 7.9 G/DL (ref 6.4–8.3)
RBC # BLD AUTO: 4.35 10E6/UL (ref 3.8–5.2)
RBC # CSF MANUAL: 0 /UL (ref 0–2)
RBC URINE: >182 /HPF
RSV RNA SPEC NAA+PROBE: NEGATIVE
S PNEUM DNA CSF QL NAA+NON-PROBE: NEGATIVE
SARS-COV-2 RNA RESP QL NAA+PROBE: NEGATIVE
SODIUM SERPL-SCNC: 136 MMOL/L (ref 135–145)
SP GR UR STRIP: 1.02 (ref 1–1.03)
SQUAMOUS EPITHELIAL: 2 /HPF
TUBE # CSF: 4
UROBILINOGEN UR STRIP-MCNC: NORMAL MG/DL
VZV DNA CSF QL NAA+NON-PROBE: NEGATIVE
WBC # BLD AUTO: 8.1 10E3/UL (ref 4–11)
WBC # CSF MANUAL: 1 /UL (ref 0–5)
WBC URINE: 28 /HPF

## 2024-12-11 PROCEDURE — 87483 CNS DNA AMP PROBE TYPE 12-25: CPT | Performed by: EMERGENCY MEDICINE

## 2024-12-11 PROCEDURE — 81025 URINE PREGNANCY TEST: CPT | Performed by: EMERGENCY MEDICINE

## 2024-12-11 PROCEDURE — 82784 ASSAY IGA/IGD/IGG/IGM EACH: CPT | Performed by: EMERGENCY MEDICINE

## 2024-12-11 PROCEDURE — 83735 ASSAY OF MAGNESIUM: CPT | Performed by: EMERGENCY MEDICINE

## 2024-12-11 PROCEDURE — 99222 1ST HOSP IP/OBS MODERATE 55: CPT | Performed by: INTERNAL MEDICINE

## 2024-12-11 PROCEDURE — 87086 URINE CULTURE/COLONY COUNT: CPT | Performed by: EMERGENCY MEDICINE

## 2024-12-11 PROCEDURE — 72158 MRI LUMBAR SPINE W/O & W/DYE: CPT

## 2024-12-11 PROCEDURE — 89050 BODY FLUID CELL COUNT: CPT | Performed by: EMERGENCY MEDICINE

## 2024-12-11 PROCEDURE — A9585 GADOBUTROL INJECTION: HCPCS | Performed by: EMERGENCY MEDICINE

## 2024-12-11 PROCEDURE — 84157 ASSAY OF PROTEIN OTHER: CPT | Performed by: EMERGENCY MEDICINE

## 2024-12-11 PROCEDURE — 84460 ALANINE AMINO (ALT) (SGPT): CPT | Performed by: EMERGENCY MEDICINE

## 2024-12-11 PROCEDURE — 87070 CULTURE OTHR SPECIMN AEROBIC: CPT | Performed by: EMERGENCY MEDICINE

## 2024-12-11 PROCEDURE — 36415 COLL VENOUS BLD VENIPUNCTURE: CPT | Performed by: EMERGENCY MEDICINE

## 2024-12-11 PROCEDURE — 82040 ASSAY OF SERUM ALBUMIN: CPT | Performed by: EMERGENCY MEDICINE

## 2024-12-11 PROCEDURE — 250N000013 HC RX MED GY IP 250 OP 250 PS 637: Performed by: INTERNAL MEDICINE

## 2024-12-11 PROCEDURE — 99285 EMERGENCY DEPT VISIT HI MDM: CPT | Mod: 25

## 2024-12-11 PROCEDURE — 258N000003 HC RX IP 258 OP 636: Performed by: EMERGENCY MEDICINE

## 2024-12-11 PROCEDURE — 009U3ZX DRAINAGE OF SPINAL CANAL, PERCUTANEOUS APPROACH, DIAGNOSTIC: ICD-10-PCS | Performed by: EMERGENCY MEDICINE

## 2024-12-11 PROCEDURE — 87637 SARSCOV2&INF A&B&RSV AMP PRB: CPT | Performed by: INTERNAL MEDICINE

## 2024-12-11 PROCEDURE — 81001 URINALYSIS AUTO W/SCOPE: CPT | Performed by: EMERGENCY MEDICINE

## 2024-12-11 PROCEDURE — 85041 AUTOMATED RBC COUNT: CPT | Performed by: EMERGENCY MEDICINE

## 2024-12-11 PROCEDURE — 82945 GLUCOSE OTHER FLUID: CPT | Performed by: EMERGENCY MEDICINE

## 2024-12-11 PROCEDURE — 255N000002 HC RX 255 OP 636: Performed by: EMERGENCY MEDICINE

## 2024-12-11 PROCEDURE — 120N000001 HC R&B MED SURG/OB

## 2024-12-11 PROCEDURE — 85004 AUTOMATED DIFF WBC COUNT: CPT | Performed by: EMERGENCY MEDICINE

## 2024-12-11 PROCEDURE — 62270 DX LMBR SPI PNXR: CPT

## 2024-12-11 RX ORDER — GADOBUTROL 604.72 MG/ML
8 INJECTION INTRAVENOUS ONCE
Status: COMPLETED | OUTPATIENT
Start: 2024-12-11 | End: 2024-12-11

## 2024-12-11 RX ORDER — CALCIUM CARBONATE 500 MG/1
1000 TABLET, CHEWABLE ORAL 4 TIMES DAILY PRN
Status: DISCONTINUED | OUTPATIENT
Start: 2024-12-11 | End: 2024-12-15 | Stop reason: HOSPADM

## 2024-12-11 RX ORDER — MULTIVITAMIN WITH IRON
500 TABLET ORAL DAILY
Status: DISCONTINUED | OUTPATIENT
Start: 2024-12-12 | End: 2024-12-14

## 2024-12-11 RX ORDER — AMOXICILLIN 250 MG
1 CAPSULE ORAL 2 TIMES DAILY PRN
Status: DISCONTINUED | OUTPATIENT
Start: 2024-12-11 | End: 2024-12-15 | Stop reason: HOSPADM

## 2024-12-11 RX ORDER — ACETAMINOPHEN 650 MG/1
650 SUPPOSITORY RECTAL EVERY 4 HOURS PRN
Status: DISCONTINUED | OUTPATIENT
Start: 2024-12-11 | End: 2024-12-15 | Stop reason: HOSPADM

## 2024-12-11 RX ORDER — ACETAMINOPHEN 325 MG/1
650 TABLET ORAL EVERY 4 HOURS PRN
Status: DISCONTINUED | OUTPATIENT
Start: 2024-12-11 | End: 2024-12-15 | Stop reason: HOSPADM

## 2024-12-11 RX ORDER — AMOXICILLIN 250 MG
2 CAPSULE ORAL 2 TIMES DAILY PRN
Status: DISCONTINUED | OUTPATIENT
Start: 2024-12-11 | End: 2024-12-15 | Stop reason: HOSPADM

## 2024-12-11 RX ORDER — MULTIVITAMIN WITH IRON
500 TABLET ORAL DAILY
Status: ON HOLD | COMMUNITY
End: 2024-12-15

## 2024-12-11 RX ORDER — MULTIPLE VITAMINS W/ MINERALS TAB 9MG-400MCG
1 TAB ORAL DAILY
Status: DISCONTINUED | OUTPATIENT
Start: 2024-12-12 | End: 2024-12-15 | Stop reason: HOSPADM

## 2024-12-11 RX ORDER — ONDANSETRON 4 MG/1
4 TABLET, ORALLY DISINTEGRATING ORAL EVERY 6 HOURS PRN
Status: DISCONTINUED | OUTPATIENT
Start: 2024-12-11 | End: 2024-12-15 | Stop reason: HOSPADM

## 2024-12-11 RX ORDER — BISACODYL 10 MG
10 SUPPOSITORY, RECTAL RECTAL DAILY PRN
Status: DISCONTINUED | OUTPATIENT
Start: 2024-12-11 | End: 2024-12-15 | Stop reason: HOSPADM

## 2024-12-11 RX ORDER — MULTIPLE VITAMINS W/ MINERALS TAB 9MG-400MCG
1 TAB ORAL DAILY
COMMUNITY

## 2024-12-11 RX ORDER — LIDOCAINE 40 MG/G
CREAM TOPICAL
Status: DISCONTINUED | OUTPATIENT
Start: 2024-12-11 | End: 2024-12-15 | Stop reason: HOSPADM

## 2024-12-11 RX ORDER — ONDANSETRON 2 MG/ML
4 INJECTION INTRAMUSCULAR; INTRAVENOUS EVERY 6 HOURS PRN
Status: DISCONTINUED | OUTPATIENT
Start: 2024-12-11 | End: 2024-12-15 | Stop reason: HOSPADM

## 2024-12-11 RX ADMIN — GADOBUTROL 8 ML: 604.72 INJECTION INTRAVENOUS at 13:51

## 2024-12-11 RX ADMIN — ACETAMINOPHEN 650 MG: 325 TABLET, FILM COATED ORAL at 22:20

## 2024-12-11 RX ADMIN — SODIUM CHLORIDE, POTASSIUM CHLORIDE, SODIUM LACTATE AND CALCIUM CHLORIDE 1000 ML: 600; 310; 30; 20 INJECTION, SOLUTION INTRAVENOUS at 18:20

## 2024-12-11 ASSESSMENT — COLUMBIA-SUICIDE SEVERITY RATING SCALE - C-SSRS
6. HAVE YOU EVER DONE ANYTHING, STARTED TO DO ANYTHING, OR PREPARED TO DO ANYTHING TO END YOUR LIFE?: NO
1. IN THE PAST MONTH, HAVE YOU WISHED YOU WERE DEAD OR WISHED YOU COULD GO TO SLEEP AND NOT WAKE UP?: NO
2. HAVE YOU ACTUALLY HAD ANY THOUGHTS OF KILLING YOURSELF IN THE PAST MONTH?: NO

## 2024-12-11 ASSESSMENT — ACTIVITIES OF DAILY LIVING (ADL)
DRESSING/BATHING_DIFFICULTY: NO
WALKING_OR_CLIMBING_STAIRS_DIFFICULTY: YES
DIFFICULTY_COMMUNICATING: NO
ADLS_ACUITY_SCORE: 21
DOING_ERRANDS_INDEPENDENTLY_DIFFICULTY: YES
ADLS_ACUITY_SCORE: 41
ADLS_ACUITY_SCORE: 41
HEARING_DIFFICULTY_OR_DEAF: NO
ADLS_ACUITY_SCORE: 41
FALL_HISTORY_WITHIN_LAST_SIX_MONTHS: YES
ADLS_ACUITY_SCORE: 21
ADLS_ACUITY_SCORE: 41
ADLS_ACUITY_SCORE: 41
ADLS_ACUITY_SCORE: 21
ADLS_ACUITY_SCORE: 41
WEAR_GLASSES_OR_BLIND: NO
ADLS_ACUITY_SCORE: 41
CONCENTRATING,_REMEMBERING_OR_MAKING_DECISIONS_DIFFICULTY: NO
CHANGE_IN_FUNCTIONAL_STATUS_SINCE_ONSET_OF_CURRENT_ILLNESS/INJURY: YES
DIFFICULTY_EATING/SWALLOWING: NO
NUMBER_OF_TIMES_PATIENT_HAS_FALLEN_WITHIN_LAST_SIX_MONTHS: 1
TOILETING_ISSUES: NO
WALKING_OR_CLIMBING_STAIRS: AMBULATION DIFFICULTY, ASSISTANCE 1 PERSON
ADLS_ACUITY_SCORE: 21
ADLS_ACUITY_SCORE: 21

## 2024-12-11 NOTE — PHARMACY-ADMISSION MEDICATION HISTORY
Pharmacist Admission Medication History    Admission medication history is complete. The information provided in this note is only as accurate as the sources available at the time of the update.    Information Source(s): Patient and CareEverywhere/SureScripts via in-person    Pertinent Information: Pt was started on 10 days course of Amoxicillin 500mg bid on 12/4/2024    Changes made to PTA medication list:  Added: B12 & MVI  Deleted: Diflucan  Changed: None    Allergies reviewed with patient and updates made in EHR: yes    Medication History Completed By: Concepcion Pantoja PharmD 12/11/2024 4:10 PM    PTA Med List   Medication Sig Last Dose/Taking    amoxicillin (AMOXIL) 500 MG capsule Take 1 capsule (500 mg) by mouth 2 times daily for 10 days. 12/10/2024    cyanocobalamin (VITAMIN B-12) 500 MCG tablet Take 500 mcg by mouth daily. 12/11/2024 Morning    multivitamin w/minerals (THERA-VIT-M) tablet Take 1 tablet by mouth daily. 12/11/2024 Morning   .

## 2024-12-11 NOTE — ED NOTES
[facility]  ED Nurse Handoff Report    ED Chief complaint: Numbness  . ED Diagnosis:   Final diagnoses:   Bilateral leg weakness   Bilateral leg numbness       Allergies: No Known Allergies    Code Status: Full Code    Activity level - Baseline/Home:  independent.  Activity Level - Current:   assist of 2.   Lift room needed: No.   Bariatric: No   Needed: No   Isolation: No.   Infection: Not Applicable.     Respiratory status: Room air    Vital Signs (within 30 minutes):   Vitals:    12/11/24 1630 12/11/24 1631 12/11/24 1632 12/11/24 1633   BP:       Pulse:       Resp:       Temp:       TempSrc:       SpO2: 100% 100% 100% 100%   Weight:       Height:           Cardiac Rhythm:  ,      Pain level:    Patient confused: No.   Patient Falls Risk: arm band in place, patient and family education, and activity supervised.   Elimination Status:         Patient Report - Per provider note:Alycia Noe is a 29 year old female presenting to the ED with numbness. Patient was seen in urgent care earlier today where she reported bilateral leg weakness and was subsequently referred to the ED. She states that over the past month she has suffered ongoing bilateral leg numbness. In the past week she states the numbness as worsened and she has started to develop weakness in the legs. She states this numbness spans from the thighs down to the ankle. She denies any shortness of breath, incontinence, dysuria, retention, back pain, neck pain, nausea, vomiting, or fever. She states her bowel movements have been normal. She has never suffered symptoms like these prior to onset one month ago. She has not been seen for these symptoms prior to today. She recently had symptoms consistent with URI, she notes that her bilateral leg numbness preceded this infection. .   Focused Assessment:      Abnormal Results:   Labs Ordered and Resulted from Time of ED Arrival to Time of ED Departure   CBC WITH PLATELETS AND  DIFFERENTIAL - Abnormal       Result Value    WBC Count 8.1      RBC Count 4.35      Hemoglobin 11.2 (*)     Hematocrit 35.6      MCV 82      MCH 25.7 (*)     MCHC 31.5      RDW 22.6 (*)     Platelet Count 265      % Neutrophils 51      % Lymphocytes 34      % Monocytes 12      % Eosinophils 2      % Basophils 0      % Immature Granulocytes 1      NRBCs per 100 WBC 0      Absolute Neutrophils 4.2      Absolute Lymphocytes 2.8      Absolute Monocytes 1.0      Absolute Eosinophils 0.1      Absolute Basophils 0.0      Absolute Immature Granulocytes 0.1      Absolute NRBCs 0.0     PROTEIN TOTAL CSF - Abnormal    Protein total CSF 13.7 (*)    COMPREHENSIVE METABOLIC PANEL - Normal    Sodium 136      Potassium 4.0      Carbon Dioxide (CO2) 23      Anion Gap 13      Urea Nitrogen 8.8      Creatinine 0.61      GFR Estimate >90      Calcium 9.2      Chloride 100      Glucose 92      Alkaline Phosphatase 60      AST 19      ALT 20      Protein Total 7.9      Albumin 4.2      Bilirubin Total 0.2     MAGNESIUM - Normal    Magnesium 2.0     GLUCOSE CSF - Normal    Glucose CSF 60     CELL COUNT CSF    Tube Number 4      Color Colorless      Clarity Clear      Total Nucleated Cells 1      RBC Count 0     HCG QUALITATIVE URINE   ROUTINE UA WITH MICROSCOPIC REFLEX TO CULTURE   OLIGOCLONAL BANDING   OLIGOCLONAL BANDING   SERUM COLLECTION   AEROBIC BACTERIAL CULTURE ROUTINE   MENINGITIS/ENCEPHALITIS PANEL QUAL PCR CSF   CELL COUNT WITH DIFFERENTIAL CSF        MR Lumbar Spine w/o & w Contrast   Final Result   IMPRESSION:  Unremarkable lumbar spine MRI. No significant   degenerative changes. No spinal canal or neural foraminal narrowing at   any level. No abnormal enhancement appreciated of the cauda equina   nerve roots.         ANAMARIA HUGHES MD            SYSTEM ID:  [id number]          Treatments provided:   Family Comments:   OBS brochure/video discussed/provided to patient:  No  ED Medications:   Medications   lactated ringers BOLUS  1,000 mL (has no administration in time range)   gadobutrol (GADAVIST) injection 8 mL (8 mLs Intravenous $Given 12/11/24 1351)       Drips infusing:  No  For the majority of the shift this patient was Green.   Interventions performed were .    Sepsis treatment initiated: No    Cares/treatment/interventions/medications to be completed following ED care:     ED Nurse Name: Kerwin Garibay RN  5:36 PM     RECEIVING UNIT ED HANDOFF REVIEW    Above ED Nurse Handoff Report was reviewed: Yes  Reviewed by: Fabiola Pacheco, RN on December 11, 2024 at 6:00 PM   JESSICA Coe called the ED to inform them the note was read: No    Fabiola Pacheco RN on 12/11/2024 at 6:00 PM

## 2024-12-11 NOTE — PROGRESS NOTES
Patient presents with:  Numbness: Patient here with complaint of bilat foot numbness for the past 3 weeks. Unable to bear much weight, stand or walk.       Clinical Decision Making:      ICD-10-CM    1. Leg weakness, bilateral  R29.898         With patient's symptoms of new bilateral leg weakness and numbness that have worsened over the past month and have not improved with vitamin B12 supplements I recommend she go to the ER now for further evaluation. Patient understood and agreed to plan. Patient was stable for discharge to ER accompanied by her mother.    Patient Instructions   With your symptoms of leg weakness and numbness that is worsening I would recommend you go to the ER for further evaluation.     HPI:  Alycia Noe is a 29 year old female who presents today with concerns of bilateral leg numbness/weakness. Symptoms have been present for the past month and have worsened to the point where she can no longer walk. No loss of bowel/bladder. Was seen last week and had some labs checked, her vitamin B12 was low and started taking oral vitamin B12 supplements but symptoms have only worsened since then.     History obtained from the patient.    Problem List:  There are no relevant problems documented for this patient.      No past medical history on file.    Social History     Tobacco Use    Smoking status: Never     Passive exposure: Never    Smokeless tobacco: Never   Substance Use Topics    Alcohol use: Not on file       ROS is negative other than what is noted in HPI.       Vitals:    12/11/24 1009   BP: 119/78   BP Location: Right arm   Patient Position: Sitting   Cuff Size: Adult Regular   Pulse: 91   Resp: 18   Temp: 98  F (36.7  C)   TempSrc: Tympanic   SpO2: 100%       Physical Exam  Constitutional:       General: She is not in acute distress.     Appearance: She is not diaphoretic.   HENT:      Head: Normocephalic and atraumatic.      Right Ear: External ear normal.      Left Ear: External ear  normal.   Eyes:      Conjunctiva/sclera: Conjunctivae normal.   Cardiovascular:      Rate and Rhythm: Normal rate and regular rhythm.      Heart sounds: No murmur heard.  Pulmonary:      Effort: Pulmonary effort is normal. No respiratory distress.   Musculoskeletal:         General: No swelling or tenderness. Normal range of motion.   Skin:     General: Skin is warm.      Capillary Refill: Capillary refill takes less than 2 seconds.   Neurological:      Mental Status: She is alert.      Sensory: Sensory deficit present.      Motor: Weakness present.      Gait: Gait abnormal.      Comments: Numbness/tingling in bilateral legs. Unable to walk without assistance.    Psychiatric:         Mood and Affect: Mood normal.         Thought Content: Thought content normal.         Judgment: Judgment normal.           MICHELLE [facility]

## 2024-12-11 NOTE — PATIENT INSTRUCTIONS
With your symptoms of leg weakness and numbness that is worsening I would recommend you go to the ER for further evaluation.

## 2024-12-11 NOTE — ED TRIAGE NOTES
Patient sent from  for bilateral leg numbness x 1 month, but has gotten worse in the past week having a harder time getting up from sitting position.  Denies any incontinence of bladder or bowels.  Denies injuries.      Triage Assessment (Adult)       Row Name 12/11/24 1132          Triage Assessment    Airway WDL WDL        Respiratory WDL    Respiratory WDL WDL        Cardiac WDL    Cardiac WDL WDL

## 2024-12-11 NOTE — ED NOTES
Room to main per provider. Pt walked slowly from intake room 2 to [facility]. Asked pt if she would like a wheelchair and pt sat into chair. Help pt stand to get into bed in room 29 with assistance of 2nd RN when pt collapsed next to wheelchair and was caught under each shoulder by writer and 2nd RN. Pt denied any injury and was assisted onto bed after airmat placement.

## 2024-12-11 NOTE — ED PROVIDER NOTES
Emergency Department Note      History of Present Illness     Chief Complaint   Numbness      HPI   Alycia Noe is a 29 year old female presenting to the ED with numbness. Patient was seen in urgent care earlier today where she reported bilateral leg weakness and was subsequently referred to the ED. She states that over the past month she has suffered ongoing bilateral leg numbness. In the past week she states the numbness as worsened and she has started to develop weakness in the legs. She states this numbness spans from the thighs down to the ankle. She denies any shortness of breath, saddle anesthesia, urinary incontinence, dysuria, urinary retention, back pain, neck pain, nausea, vomiting, or fever. She states her bowel movements have been normal. She has never suffered symptoms like these prior to onset one month ago. She has not been seen for these symptoms prior to today. She recently had symptoms consistent with URI, she notes that her bilateral leg numbness preceded this infection.    Independent Historian   None    Review of External Notes   Reviewed urgent care visit note from today.    Past Medical History     Medical History and Problem List   No past medical history on file.    Medications   Nexplanon    Surgical History   No past surgical history on file.    Physical Exam     Patient Vitals for the past 24 hrs:   BP Temp Temp src Pulse Resp SpO2 Height Weight   12/11/24 1633 -- -- -- -- -- 100 % -- --   12/11/24 1632 -- -- -- -- -- 100 % -- --   12/11/24 1631 -- -- -- -- -- 100 % -- --   12/11/24 1630 -- -- -- -- -- 100 % -- --   12/11/24 1629 -- -- -- -- -- 100 % -- --   12/11/24 1628 -- -- -- -- -- 100 % -- --   12/11/24 1627 -- -- -- -- -- 100 % -- --   12/11/24 1626 -- -- -- -- -- 100 % -- --   12/11/24 1546 -- -- -- -- -- 100 % -- --   12/11/24 1541 -- -- -- -- -- 100 % -- --   12/11/24 1536 116/83 -- -- 83 -- -- -- --   12/11/24 1313 -- -- -- -- -- 100 % -- --   12/11/24 1312 -- -- -- -- --  "100 % -- --   12/11/24 1311 -- -- -- -- -- 100 % -- --   12/11/24 1310 -- -- -- -- -- 100 % -- --   12/11/24 1309 -- -- -- -- -- 100 % -- --   12/11/24 1308 -- -- -- -- -- 100 % -- --   12/11/24 1307 -- -- -- -- -- 99 % -- --   12/11/24 1306 -- -- -- -- -- 100 % -- --   12/11/24 1220 -- -- -- -- -- 99 % -- --   12/11/24 1215 -- -- -- -- -- 100 % -- --   12/11/24 1210 -- -- -- -- -- 99 % -- --   12/11/24 1205 -- -- -- -- -- 96 % -- --   12/11/24 1132 (!) 137/94 98.6  F (37  C) Temporal 97 18 99 % 1.676 m (5' 6\") 84.3 kg (185 lb 13.6 oz)     Physical Exam  Constitutional:       Appearance: Normal appearance.      General: Not in acute distress.  HENT:      Head: Normocephalic and atraumatic.   Eyes:      Extraocular Movements: Extraocular movements intact.      Conjunctiva/sclera: Conjunctivae normal.   Cardiovascular:      Rate and Rhythm: Normal rate and regular rhythm.   Pulmonary:      Effort: Pulmonary effort is normal. No respiratory distress.   Abdominal:      General: Abdomen is flat. There is no distension.   Musculoskeletal:         General: No swelling or deformity.  No midline T/L-spine tenderness to palpation.     Cervical back: Normal range of motion. No rigidity.   Skin:     Coloration: Skin is not jaundiced or pale.   Neurological:      General: No focal deficit present.      Mental Status: Alert and oriented to person, place, and time.      Lower extremity: Difficult to elicit bilateral patellar tendon reflexes.  Reports diminished sensation to bilateral lower extremities.  Psychiatric:         Mood and Affect: Mood normal.         Behavior: Behavior normal.    Diagnostics     Lab Results   Labs Ordered and Resulted from Time of ED Arrival to Time of ED Departure   CBC WITH PLATELETS AND DIFFERENTIAL - Abnormal       Result Value    WBC Count 8.1      RBC Count 4.35      Hemoglobin 11.2 (*)     Hematocrit 35.6      MCV 82      MCH 25.7 (*)     MCHC 31.5      RDW 22.6 (*)     Platelet Count 265      % " Neutrophils 51      % Lymphocytes 34      % Monocytes 12      % Eosinophils 2      % Basophils 0      % Immature Granulocytes 1      NRBCs per 100 WBC 0      Absolute Neutrophils 4.2      Absolute Lymphocytes 2.8      Absolute Monocytes 1.0      Absolute Eosinophils 0.1      Absolute Basophils 0.0      Absolute Immature Granulocytes 0.1      Absolute NRBCs 0.0     PROTEIN TOTAL CSF - Abnormal    Protein total CSF 13.7 (*)    COMPREHENSIVE METABOLIC PANEL - Normal    Sodium 136      Potassium 4.0      Carbon Dioxide (CO2) 23      Anion Gap 13      Urea Nitrogen 8.8      Creatinine 0.61      GFR Estimate >90      Calcium 9.2      Chloride 100      Glucose 92      Alkaline Phosphatase 60      AST 19      ALT 20      Protein Total 7.9      Albumin 4.2      Bilirubin Total 0.2     MAGNESIUM - Normal    Magnesium 2.0     HCG QUALITATIVE URINE - Normal    hCG Urine Qualitative Negative     GLUCOSE CSF - Normal    Glucose CSF 60     CELL COUNT CSF    Tube Number 4      Color Colorless      Clarity Clear      Total Nucleated Cells 1      RBC Count 0     OLIGOCLONAL BANDING   OLIGOCLONAL BANDING   SERUM COLLECTION   AEROBIC BACTERIAL CULTURE ROUTINE    Gram Stain Result No organisms seen      Gram Stain Result No white blood cells seen     MENINGITIS/ENCEPHALITIS PANEL QUAL PCR CSF   CELL COUNT WITH DIFFERENTIAL CSF       Imaging   MR Lumbar Spine w/o & w Contrast   Final Result   IMPRESSION:  Unremarkable lumbar spine MRI. No significant   degenerative changes. No spinal canal or neural foraminal narrowing at   any level. No abnormal enhancement appreciated of the cauda equina   nerve roots.         ANAMARIA HUGHES MD            SYSTEM ID:  [id number]        Independent Interpretation   None    ED Course      Medications Administered   Medications   lactated ringers BOLUS 1,000 mL (1,000 mLs Intravenous $New Bag 12/11/24 4164)   gadobutrol (GADAVIST) injection 8 mL (8 mLs Intravenous $Given 12/11/24 1351)       Procedures    Procedures     Lumbar Puncture      Procedure: Lumbar Puncture      Indication: abnormal neurologic exam     Consent: Written from Patient  Risks Discussed (including but not limited to): Infection, Bleeding, Spinal headache with possibility of spinal patch, and Temporary or permanent neurological injury     Universal Protocol: Universal protocol was followed and time out conducted just prior to starting procedure, confirming patient identity, site/side, procedure, patient position, and availability of correct equipment and implants.      Anesthesia/Sedation: Lidocaine - 1%     Procedure Note:     Patient was placed in a sitting position.  The skin overlying the L4-5 area was prepped with povidone-iodine.    The patient was medicated as above.   A 20 gauge spinal needle was used to gain access to the subarachnoid space with stylet in place.   Opening pressure was not measured.   The fluid was clear.   Stylet was replaced and needle withdrawn.      Patient Status:  The patient tolerated the procedure well: Yes. There were no complications.      Discussion of Management   None    ED Course   ED Course as of 12/11/24 1839   Wed Dec 11, 2024   1147 I obtained history and examined the patient as noted above.   1429 42 mL postvoid residual.   1649 Protein total CSF(!): 13.7  Inconsistent with Guillain-Barré syndrome.   1657 [place]   1719 Total Nucleated Cells: 1       Additional Documentation  None    Medical Decision Making / Diagnosis     CMS Diagnoses: None    MIPS       CT/MRI of the lumbar spine was obtained because of neurologic signs or symptoms (lower extremity weakness).    WILLIAM Noe is a 29 year old female as described above presents to the emergency department with bilateral lower extremity worsening numbness and weakness.  No associated low back pain or saddle anesthesia or loss of bowel or bladder control.  Differential diagnosis considered includes, but not limited to, spinal cord compression,  lumbar stenosis, cauda equina syndrome, demyelinating disease process, or Guillain-Barré syndrome.  Will obtain MRI of the lumbar spine for evaluation of above discussed differentials.  Most likely require lumbar puncture for rule out of GBS.  Discussed care plan with patient who voiced understanding and agreement with plan.  Answered all questions.  Additional workup and orders as listed in chart.    Ultimately, work up shows unremarkable CSF.  Blood work otherwise unremarkable.  MRI lumbar spine unremarkable.  However, patient still endorsing significant bilateral lower extremity weakness and requiring significant assistance with ambulation.  Patient was admitted to the hospital service for further evaluation and treatment and assessment by general neurology.    Please refer to ED course above as part of continuation of MDM for details on the patient's treatment course and any potential changes or updates beyond my initial evaluation and MDM creation.    Disposition   The patient was admitted to the hospital.     Diagnosis     ICD-10-CM    1. Bilateral leg weakness  R29.898       2. Bilateral leg numbness  R20.0            Discharge Medications   Current Discharge Medication List        Scribe Disclosure:  JESSICA, ANTELMO WELLS, am serving as a scribe at 11:53 AM on 12/11/2024 to document services personally performed by Gilmer Allen DO based on my observations and the provider's statements to me.        Gilmer Allen DO  12/11/24 0822

## 2024-12-12 ENCOUNTER — APPOINTMENT (OUTPATIENT)
Dept: PHYSICAL THERAPY | Facility: CLINIC | Age: 29
End: 2024-12-12
Attending: INTERNAL MEDICINE
Payer: COMMERCIAL

## 2024-12-12 VITALS
DIASTOLIC BLOOD PRESSURE: 45 MMHG | RESPIRATION RATE: 20 BRPM | WEIGHT: 185.85 LBS | HEART RATE: 80 BPM | TEMPERATURE: 98.2 F | OXYGEN SATURATION: 98 % | SYSTOLIC BLOOD PRESSURE: 98 MMHG | BODY MASS INDEX: 29.87 KG/M2 | HEIGHT: 66 IN

## 2024-12-12 LAB
ANION GAP SERPL CALCULATED.3IONS-SCNC: 11 MMOL/L (ref 7–15)
BACTERIA CSF CULT: NORMAL
BUN SERPL-MCNC: 8.2 MG/DL (ref 6–20)
CALCIUM SERPL-MCNC: 8.7 MG/DL (ref 8.8–10.4)
CHLORIDE SERPL-SCNC: 107 MMOL/L (ref 98–107)
CREAT SERPL-MCNC: 0.63 MG/DL (ref 0.51–0.95)
EGFRCR SERPLBLD CKD-EPI 2021: >90 ML/MIN/1.73M2
ERYTHROCYTE [DISTWIDTH] IN BLOOD BY AUTOMATED COUNT: 22.4 % (ref 10–15)
GLUCOSE BLDC GLUCOMTR-MCNC: 100 MG/DL (ref 70–99)
GLUCOSE SERPL-MCNC: 93 MG/DL (ref 70–99)
GRAM STAIN RESULT: NORMAL
GRAM STAIN RESULT: NORMAL
HCO3 SERPL-SCNC: 24 MMOL/L (ref 22–29)
HCT VFR BLD AUTO: 33 % (ref 35–47)
HGB BLD-MCNC: 10.8 G/DL (ref 11.7–15.7)
MCH RBC QN AUTO: 26.6 PG (ref 26.5–33)
MCHC RBC AUTO-ENTMCNC: 32.7 G/DL (ref 31.5–36.5)
MCV RBC AUTO: 81 FL (ref 78–100)
PLATELET # BLD AUTO: 224 10E3/UL (ref 150–450)
POTASSIUM SERPL-SCNC: 4.2 MMOL/L (ref 3.4–5.3)
RBC # BLD AUTO: 4.06 10E6/UL (ref 3.8–5.2)
SODIUM SERPL-SCNC: 142 MMOL/L (ref 135–145)
TSH SERPL DL<=0.005 MIU/L-ACNC: 0.86 UIU/ML (ref 0.3–4.2)
WBC # BLD AUTO: 5 10E3/UL (ref 4–11)

## 2024-12-12 PROCEDURE — 84446 ASSAY OF VITAMIN E: CPT | Performed by: PSYCHIATRY & NEUROLOGY

## 2024-12-12 PROCEDURE — 84443 ASSAY THYROID STIM HORMONE: CPT | Performed by: PSYCHIATRY & NEUROLOGY

## 2024-12-12 PROCEDURE — 99232 SBSQ HOSP IP/OBS MODERATE 35: CPT | Performed by: INTERNAL MEDICINE

## 2024-12-12 PROCEDURE — 85048 AUTOMATED LEUKOCYTE COUNT: CPT | Performed by: INTERNAL MEDICINE

## 2024-12-12 PROCEDURE — 97530 THERAPEUTIC ACTIVITIES: CPT | Mod: GP

## 2024-12-12 PROCEDURE — 85018 HEMOGLOBIN: CPT | Performed by: INTERNAL MEDICINE

## 2024-12-12 PROCEDURE — 82525 ASSAY OF COPPER: CPT | Performed by: PSYCHIATRY & NEUROLOGY

## 2024-12-12 PROCEDURE — 86334 IMMUNOFIX E-PHORESIS SERUM: CPT | Performed by: PATHOLOGY

## 2024-12-12 PROCEDURE — 36415 COLL VENOUS BLD VENIPUNCTURE: CPT | Performed by: INTERNAL MEDICINE

## 2024-12-12 PROCEDURE — 97161 PT EVAL LOW COMPLEX 20 MIN: CPT | Mod: GP

## 2024-12-12 PROCEDURE — 86780 TREPONEMA PALLIDUM: CPT | Performed by: PSYCHIATRY & NEUROLOGY

## 2024-12-12 PROCEDURE — 86334 IMMUNOFIX E-PHORESIS SERUM: CPT | Mod: 26 | Performed by: PATHOLOGY

## 2024-12-12 PROCEDURE — 87389 HIV-1 AG W/HIV-1&-2 AB AG IA: CPT | Performed by: PSYCHIATRY & NEUROLOGY

## 2024-12-12 PROCEDURE — 86364 TISS TRNSGLTMNASE EA IG CLAS: CPT | Performed by: STUDENT IN AN ORGANIZED HEALTH CARE EDUCATION/TRAINING PROGRAM

## 2024-12-12 PROCEDURE — 80048 BASIC METABOLIC PNL TOTAL CA: CPT | Performed by: INTERNAL MEDICINE

## 2024-12-12 PROCEDURE — 82550 ASSAY OF CK (CPK): CPT | Performed by: STUDENT IN AN ORGANIZED HEALTH CARE EDUCATION/TRAINING PROGRAM

## 2024-12-12 PROCEDURE — 250N000013 HC RX MED GY IP 250 OP 250 PS 637: Performed by: INTERNAL MEDICINE

## 2024-12-12 PROCEDURE — 84425 ASSAY OF VITAMIN B-1: CPT | Performed by: PSYCHIATRY & NEUROLOGY

## 2024-12-12 PROCEDURE — 86618 LYME DISEASE ANTIBODY: CPT | Performed by: PSYCHIATRY & NEUROLOGY

## 2024-12-12 PROCEDURE — 120N000001 HC R&B MED SURG/OB

## 2024-12-12 PROCEDURE — 82552 ASSAY OF CPK IN BLOOD: CPT | Performed by: PSYCHIATRY & NEUROLOGY

## 2024-12-12 PROCEDURE — 86038 ANTINUCLEAR ANTIBODIES: CPT | Performed by: PSYCHIATRY & NEUROLOGY

## 2024-12-12 PROCEDURE — 86140 C-REACTIVE PROTEIN: CPT | Performed by: STUDENT IN AN ORGANIZED HEALTH CARE EDUCATION/TRAINING PROGRAM

## 2024-12-12 PROCEDURE — 84630 ASSAY OF ZINC: CPT | Performed by: PSYCHIATRY & NEUROLOGY

## 2024-12-12 PROCEDURE — 36415 COLL VENOUS BLD VENIPUNCTURE: CPT | Performed by: PSYCHIATRY & NEUROLOGY

## 2024-12-12 PROCEDURE — 82435 ASSAY OF BLOOD CHLORIDE: CPT | Performed by: INTERNAL MEDICINE

## 2024-12-12 PROCEDURE — 83550 IRON BINDING TEST: CPT | Performed by: INTERNAL MEDICINE

## 2024-12-12 RX ORDER — CYANOCOBALAMIN 1000 UG/ML
1000 INJECTION, SOLUTION INTRAMUSCULAR; SUBCUTANEOUS
Status: DISCONTINUED | OUTPATIENT
Start: 2024-12-12 | End: 2024-12-14

## 2024-12-12 RX ORDER — IBUPROFEN 400 MG/1
400 TABLET, FILM COATED ORAL EVERY 6 HOURS PRN
Status: DISCONTINUED | OUTPATIENT
Start: 2024-12-12 | End: 2024-12-15 | Stop reason: HOSPADM

## 2024-12-12 RX ADMIN — Medication 1 TABLET: at 09:13

## 2024-12-12 RX ADMIN — CYANOCOBALAMIN TAB 500 MCG 500 MCG: 500 TAB at 09:13

## 2024-12-12 RX ADMIN — IBUPROFEN 400 MG: 400 TABLET, FILM COATED ORAL at 12:13

## 2024-12-12 ASSESSMENT — ACTIVITIES OF DAILY LIVING (ADL)
ADLS_ACUITY_SCORE: 30
ADLS_ACUITY_SCORE: 30
ADLS_ACUITY_SCORE: 31
ADLS_ACUITY_SCORE: 30
ADLS_ACUITY_SCORE: 30
ADLS_ACUITY_SCORE: 35
ADLS_ACUITY_SCORE: 21
ADLS_ACUITY_SCORE: 31
ADLS_ACUITY_SCORE: 31
ADLS_ACUITY_SCORE: 35
ADLS_ACUITY_SCORE: 31
ADLS_ACUITY_SCORE: 30
ADLS_ACUITY_SCORE: 31
ADLS_ACUITY_SCORE: 30
ADLS_ACUITY_SCORE: 31
ADLS_ACUITY_SCORE: 31
ADLS_ACUITY_SCORE: 30
ADLS_ACUITY_SCORE: 31
ADLS_ACUITY_SCORE: 31
ADLS_ACUITY_SCORE: 30
ADLS_ACUITY_SCORE: 30

## 2024-12-12 NOTE — CONSULTS
REPORT OF CONSULTATION  Patient Name: Alycia Noe   MRN: [id number]   : 1995   Admission Date/Time: 2024 11:35 AM   Primary Care Physician: No Ref-Primary, Physician   Consulting Physician: Sharmin Beatty MD    REASON FOR CONSULTATION  I am asked to see this patient in consultation at the request of Tenzin Luo DO for evaluation of leg weakness.     CONSULTATION IMPRESSION/RECOMMENDATIONS:   Active Problems:    Bilateral leg numbness    Bilateral leg weakness     This is a 29 year old woman who presents with subacute progressive  bilateral predominantly proximal  lower extremities as well as left upper distal extremity weakness, loss of proprioception. Based on exam concern for possible spinal cord process (myelopathy - could be subacute combined degeneration from recently determined vitamin B12 deficiency vs other metabolic or inflammatory process) vs central and peripheral (myeloneuropathy).  -will evaluate with MRI cervical and thoracic spine for cord lesions  -additional blood work  -EMG when able  -B12 as injection given concern for severe neurological condition  -therapies and rehab eval    Will continue to follow.   Please page with questions: pager [phone]  I thank Dr. Tenzin Luo for the opportunity to participate in the patient's care.     ------------------------------------------------------------------------------------------------------    HPI: This is a 29 year old female without significant PMH who was admitted yesterday after she presented with concerns for progressive bilateral weakness and numbness of her lower extremities. Patient just finished antibiotics for PNA just PTA. Upon evaluation patient was diagnosed with UTI.     Currently patient reported that her legs are weak. It's getting worse. Started gradually per my understanding.  Patient's family reported  that patient is not able to walk anymore. Patient reported that her legs do feel numb over  upper thighs and over anterior shins, denied numbness over her feet. Denied symptoms in upper extremities. Denied visual changes, speech changes, swallowing issues, bowel/bladder changes. She did admit to some stomach pains.   When I asked about B12 patient reported that bought supplement and started taking it just recently. She is vegetarian. Otherwise doesn't take any medications, except recently patient was sick with pneumonia.     PAST MEDICAL HISTORY  No past medical history on file.     PAST SURGICAL HISTORY  No past surgical history on file.     ALLERGIES/SENSITIVITIES  No Known Allergies      CURRENT MEDS    No current outpatient medications on file.        SOCIAL HISTORY  Social History     Socioeconomic History    Marital status: Single     Spouse name: Not on file    Number of children: Not on file    Years of education: Not on file    Highest education level: Not on file   Occupational History    Not on file   Tobacco Use    Smoking status: Never     Passive exposure: Never    Smokeless tobacco: Never   Substance and Sexual Activity    Alcohol use: Not on file    Drug use: Not on file    Sexual activity: Not on file   Other Topics Concern    Not on file   Social History Narrative    Not on file     Social Drivers of Health     Financial Resource Strain: Low Risk  (12/11/2024)    Financial Resource Strain     Within the past 12 months, have you or your family members you live with been unable to get utilities (heat, electricity) when it was really needed?: No   Food Insecurity: Low Risk  (12/11/2024)    Food Insecurity     Within the past 12 months, did you worry that your food would run out before you got money to buy more?: No     Within the past 12 months, did the food you bought just not last and you didn t have money to get more?: No   Transportation Needs: Low Risk  (12/11/2024)    Transportation Needs     Within the past 12 months, has lack of transportation kept you from medical appointments,  "getting your medicines, non-medical meetings or appointments, work, or from getting things that you need?: No   Physical Activity: Not on file   Stress: Not on file   Social Connections: Not on file   Interpersonal Safety: Low Risk  (12/11/2024)    Interpersonal Safety     Do you feel physically and emotionally safe where you currently live?: Yes     Within the past 12 months, have you been hit, slapped, kicked or otherwise physically hurt by someone?: No     Within the past 12 months, have you been humiliated or emotionally abused in other ways by your partner or ex-partner?: No   Housing Stability: Low Risk  (12/11/2024)    Housing Stability     Do you have housing? : Yes     Are you worried about losing your housing?: No       FAMILY HISTORY  No family history on file.     REVIEW OF SYSTEMS: Systems (general, cardiovascular, respiratory, ENT/eyes, GI, , musculo-skeletal, neuro, psychiatric, allergology-immunologic and endocrine) were reviewed with pertinent positives noted in HPI and otherwise all others were negative.     EXAM: /58 (BP Location: Right arm)   Pulse 70   Temp 98.4  F (36.9  C) (Oral)   Resp 16   Ht 1.676 m (5' 6\")   Wt 84.3 kg (185 lb 13.6 oz)   LMP 12/11/2024 (Approximate)   SpO2 100%   BMI 30.00 kg/m     General Appearance: no acute distress  HEENT: NC/AT  Neck: full passive ROM; no carotid bruits  Chest: CTAB; normal respiratory effort  Cardio: RRR; no murmurs; normal peripheral pulses  Abd: NT/ND  Mental status: Alert and oriented to person, place and time. Speech clear and language are within normal limits.  Cranial nerves: Pupils equal, round, and reactive to light. Extraocular movements intact. Visual fields full to confrontation. Facial strength normal and sensation intact bilaterally. Palate elevation symmetric. Hearing intact. Tongue protrusion midline. Shoulder shrug symmetric.  Motor: Muscle tone and bulk are normal.  Strength finger flexors R5, L4, finger extensors R4, " L3, finger abductors R 4, L3, wrist extensors R 5, L4, elbow flexion R5, L4, elbow extension R5, L3, shoulder abduction R5, L5. Hip flexion R and L 3-,  hip adduction 5, hip abduction 5, knee extension 3 bilaterally, knee flexion 5 bl, foot dorsiflexion 5 bl,  plantar 5 bl.   Sensory: Light touch , pin prick, and vibration patient reported normal bilaterally. Proprioception - impaired  both toes  Coordination: Finger-to-nose with mild ataxia on the left, heel-to-shin  - unable to perform due to leg weakness  Reflexes: severely depressed bilaterally. Extensor plantar responses bilaterally.  Gait: deferred    IMAGING: I have personally reviewed the patient's imaging:   MRI L-spine:  IMPRESSION:  Unremarkable lumbar spine MRI. No significant  degenerative changes. No spinal canal or neural foraminal narrowing at  any level. No abnormal enhancement appreciated of the cauda equina  nerve roots.    LABS:     Component      Latest Ref Rng 12/4/2024  3:11 PM   Vitamin B12      232 - 1,245 pg/mL <150 (L)       Legend:  (L) Low    Component      Latest Ref Rng 12/12/2024  5:59 AM   Sodium      135 - 145 mmol/L 142    Potassium      3.4 - 5.3 mmol/L 4.2    Chloride      98 - 107 mmol/L 107    Carbon Dioxide (CO2)      22 - 29 mmol/L 24    Anion Gap      7 - 15 mmol/L 11    Urea Nitrogen      6.0 - 20.0 mg/dL 8.2    Creatinine      0.51 - 0.95 mg/dL 0.63    GFR Estimate      >60 mL/min/1.73m2 >90    Calcium      8.8 - 10.4 mg/dL 8.7 (L)    Glucose      70 - 99 mg/dL 93    WBC      4.0 - 11.0 10e3/uL 5.0    RBC Count      3.80 - 5.20 10e6/uL 4.06    Hemoglobin      11.7 - 15.7 g/dL 10.8 (L)    Hematocrit      35.0 - 47.0 % 33.0 (L)    MCV      78 - 100 fL 81    MCH      26.5 - 33.0 pg 26.6    MCHC      31.5 - 36.5 g/dL 32.7    RDW      10.0 - 15.0 % 22.4 (H)    Platelet Count      150 - 450 10e3/uL 224       Legend:  (L) Low  (H) High  Component      Latest Ref Rng 12/11/2024  3:40 PM   Escherichia coli K1      Negative   Negative    Haemophilus influenzae      Negative  Negative    Listeria monocytogenes      Negative  Negative    Neisseria meningitidis      Negative  Negative    Streptococcus agalactiae (GBS)      Negative  Negative    Streptococcus pneumoniae      Negative  Negative    Cytomegalovirus      Negative  Negative    Enterovirus by PCR      Negative  Negative    Herpes Simplex Virus 1      Negative  Negative    Herpes Simplex Virus 2      Negative  Negative    Human Herpes Virus 6      Negative  Negative    Human Parechovirus      Negative  Negative    Varicella Zoster Virus      Negative  Negative    Cryptococcus neoformans/gattii      Negative  Negative        Component      Latest Ref Rng 12/11/2024  3:40 PM   Tube Number 4    Color CSF      Colorless  Colorless    Appearance CSF      Clear  Clear    Total Nucleated Cells      0 - 5 /uL 1    RBC CSF      0 - 2 /uL 0    Glucose CSF      40 - 70 mg/dL 60    Protein total CSF      15.0 - 45.0 mg/dL 13.7 (L)       Legend:  (L) Low    Component      Latest Ref Rng 12/11/2024  6:07 PM   Color Urine      Colorless, Straw, Light Yellow, Yellow  Light Yellow    Appearance Urine      Clear  Clear    Glucose Urine      Negative mg/dL Negative    Bilirubin Urine      Negative  Negative    Ketones Urine      Negative mg/dL Negative    Specific Gravity Urine      1.003 - 1.035  1.017    Blood Urine      Negative  Large !    pH Urine      5.0 - 7.0  5.5    Protein Albumin Urine      Negative mg/dL Negative    Urobilinogen mg/dL      Normal, 2.0 mg/dL Normal    Nitrite Urine      Negative  Negative    Leukocyte Esterase Urine      Negative  Small !    Bacteria Urine      None Seen /HPF Few !    Mucus Urine      None Seen /LPF Present !    RBC Urine      <=2 /HPF >182 (H)    WBC Urine      <=5 /HPF 28 (H)    Squamous Epithelial /HPF Urine      <=1 /HPF 2 (H)       Legend:  ! Abnormal  (H) High    Total consult time 95 minutes with the time spent on chart review, imaging and lab  results review, and more than 50 % of the time spent on coordination of cares and face-to-face time with the patient including counseling regarding pathophysiology of the above conditions, results of the tests and further directions of care.     Sharmin Beatty MD  [facility]

## 2024-12-12 NOTE — PLAN OF CARE
"Pt A/O, able to verbalize needs. CMS intact with exception of numbness and weakness in ANDIE LE. Unable to bear weight without assist. Used al steady to go to BR. Pt able to empty bladder. On menses. Complained of mild leg pain- PRN tylenol given. Tolerating diet. No nausea. Bed alarm on for safety. Neurology to see today.      Major Shift Events:  Uneventful night, slept well. No significant change in condition.     Treatment Plan:   Neuro check. Monitor labs. Symptom management. Neurology consult today. Bed alarm and assist when OOB.                             Problem: Adult Inpatient Plan of Care  Goal: Plan of Care Review  Description: The Plan of Care Review/Shift note should be completed every shift.  The Outcome Evaluation is a brief statement about your assessment that the patient is improving, declining, or no change.  This information will be displayed automatically on your shift  note.  Outcome: Progressing  Flowsheets (Taken 12/12/2024 0703)  Plan of Care Reviewed With: patient  Overall Patient Progress: no change  Goal: Patient-Specific Goal (Individualized)  Description: You can add care plan individualizations to a care plan. Examples of Individualization might be:  \"Parent requests to be called daily at 9am for status\", \"I have a hard time hearing out of my right ear\", or \"Do not touch me to wake me up as it startles  me\".  Outcome: Progressing  Goal: Absence of Hospital-Acquired Illness or Injury  Outcome: Progressing  Intervention: Identify and Manage Fall Risk  Recent Flowsheet Documentation  Taken 12/11/2024 2100 by Hermelinda Madden, RN  Safety Promotion/Fall Prevention: safety round/check completed  Intervention: Prevent Skin Injury  Recent Flowsheet Documentation  Taken 12/11/2024 2100 by Hermelinda Madden RN  Body Position: position changed independently  Goal: Optimal Comfort and Wellbeing  Outcome: Progressing  Goal: Readiness for Transition of Care  Outcome: Progressing   Goal Outcome " Evaluation:      Plan of Care Reviewed With: patient    Overall Patient Progress: no changeOverall Patient Progress: no change

## 2024-12-12 NOTE — PROGRESS NOTES
12/12/24 1128   Appointment Info   Signing Clinician's Name / Credentials (PT) Oralia Aguilera DPT   Living Environment   People in Home parent(s)   Current Living Arrangements apartment   Home Accessibility no concerns   Transportation Anticipated family or friend will provide   Living Environment Comments Pt reports living with her mom in apt. Tub shower, elevator access.   Self-Care   Usual Activity Tolerance good   Current Activity Tolerance fair   Regular Exercise No   Equipment Currently Used at Home none   Fall history within last six months yes   Number of times patient has fallen within last six months 1   Activity/Exercise/Self-Care Comment Pt reports weakness over last month, worsening in last 3 days where she has been unable to stand on her own. 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   General Information   Onset of Illness/Injury or Date of Surgery 12/11/24   Referring Physician Tenzin Luo, DO   Patient/Family Therapy Goals Statement (PT) return to baseline   Pertinent History of Current Problem (include personal factors and/or comorbidities that impact the POC) Alycia Noe is a 29 year old female admitted on 12/11/2024. She has a past medical history significant for tobacco use disorder.  She presented to emergency room for a 1 month history of progressive bilateral lower extremity weakness and numbness.  Weakness and numbness now go from her feet all the way up to the upper thigh area on both legs.  Having difficulty walking because of weakness and numbness.  Finished a course of amoxicillin for suspected pneumonia yesterday.   Existing Precautions/Restrictions fall   Cognition   Affect/Mental Status (Cognition) WNL   Follows Commands (Cognition) WNL   Pain Assessment   Patient Currently in Pain Yes, see Vital Sign flowsheet   Integumentary/Edema   Integumentary/Edema no deficits were identifed   Posture    Posture Not impaired   Range of  "Motion (ROM)   Range of Motion ROM deficits secondary to weakness   Strength (Manual Muscle Testing)   Strength (Manual Muscle Testing) Deficits observed during functional mobility   Strength Comments unable to SLR, lacking AROM in BLEs d/t weakness, pain with MMT   Bed Mobility   Comment, (Bed Mobility) supine<>sit wtih min A   Transfers   Comment, (Transfers) sit<>stand with FWW and Ngozi   Gait/Stairs (Locomotion)   Comment, (Gait/Stairs) amb with FWW and min A, minimal step length and foot clearance   Balance   Balance Comments impaired; needing FWW and Ngozi   Sensory Examination   Sensory Perception Comments reporting BLEs from thigh down feel \"frozen\", noting numbness and pain   Clinical Impression   Criteria for Skilled Therapeutic Intervention Yes, treatment indicated   PT Diagnosis (PT) impaired functional mobility   Influenced by the following impairments weakness, pain, impaired balance, deconditioning   Functional limitations due to impairments difficulty with bed mobility, transfers, ambulation   Clinical Presentation (PT Evaluation Complexity) stable   Clinical Presentation Rationale clinical judgement   Clinical Decision Making (Complexity) low complexity   Planned Therapy Interventions (PT) balance training;bed mobility training;gait training;home exercise program;neuromuscular re-education;patient/family education;ROM (range of motion);strengthening;transfer training;progressive activity/exercise;wheelchair management/propulsion training;risk factor education;home program guidelines   Risk & Benefits of therapy have been explained evaluation/treatment results reviewed;care plan/treatment goals reviewed;risks/benefits reviewed;current/potential barriers reviewed;participants voiced agreement with care plan;participants included;patient   PT Total Evaluation Time   PT Eval, Low Complexity Minutes (18553) 10   Physical Therapy Goals   PT Frequency Daily   PT Predicted Duration/Target Date for Goal " Attainment 12/19/24   PT Goals Transfers;Bed Mobility;Gait   PT: Bed Mobility Independent;Supine to/from sit   PT: Transfers Modified independent;Sit to/from stand;Assistive device   PT: Gait Modified independent;Assistive device;Greater than 200 feet   PT Discharge Planning   PT Plan PT: progress IND bed mob, LE strength, repeated STS, progress gait with w/c follow   PT Discharge Recommendation (DC Rec) [facility] patient will benefit from intensive, interdisciplinary therapy.  Anticipate will be able to tolerate 3 hours of therapy per day   PT Rationale for DC Rec Patient is below baseline functional mobility. Pt lives with mother, is highly IND at baseline. Currently needing A x 1 to stand and ambulate wtih walker max of 15 feet. Pt limited by LE weakness, deconditioning, pain, and impaired balance. Currently rec ARU to progress strength and IND mobility. Pt would need to be mod-I to be able to safely d/c home. Pending medical progress, will continue to update recs.   PT Brief overview of current status A x 2 al steady   Physical Therapy Time and Intention   Total Session Time (sum of timed and untimed services) 10

## 2024-12-12 NOTE — PROGRESS NOTES
[facility]    Medicine Progress Note - Hospitalist Service    Date of Admission:  12/11/2024    Assessment & Plan     Alycia Noe is a 29 year old female admitted on 12/11/2024. She has a past medical history significant for tobacco use disorder.  She presented to the [facility] emergency room on 12/11/2024 due to 1 month history of progressive bilateral lower extremity weakness and numbness.  Weakness and numbness now go from her feet all the way up to the upper thigh area on both legs.  Having difficulty walking because of weakness and numbness.  Finished a course of amoxicillin for suspected pneumonia the day prior to admission.  At this point she denies any respiratory symptoms.    Here she was hemodynamically stable.  Lab workup was notable for mild anemia with a hemoglobin of 11.2 with grossly normal kidney function and electrolytes, white blood count, glucose, negative pregnancy test.  Urine analysis is abnormal with large blood, small leukocyte esterase and 28 white cells.  COVID-19, influenza and RSV testing was negative.  Lumbar puncture performed in the ER was negative for evidence of infection and protein was actually somewhat low at 13.7.  Fluid was clear with only 1 nucleated cell and glucose of 60.  MRI lumbar spine was unremarkable.    There is concern for Guillain-Barré syndrome versus other neuropathic process based on the history and workup so far.  Neurology consulted as we suspect she may need IVIG.    Regarding her somewhat abnormal urine sample watching off antibiotics and awaiting urine culture results.     Progressive bilateral lower extremity weakness and numbness.  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  -Have progressed to the point of being all the way up to upper thigh area bilaterally.  -Recently completed course of amoxicillin for suspected  "pneumonia.  -Denies any diarrhea over the past month.  -MRI lumbar spine and lumbar puncture with labs done in ER as above.  -Neurology consult.  Question initiation of IVIG.  Plasma exchange could be a consideration as well but would require transfer to a different facility.  -If she were to develop any diarrhea, would check enteric bacteria and virus panel.  -PT consulted     Tobacco use disorder.  -advise complete smoking cessation.  -Nicotine gum if needed.     Anemia.  -Mild.  Hemoglobin 11.2 upon presentation        Diet: Combination Diet Regular Diet Adult    DVT Prophylaxis: Pneumatic Compression Devices  Gonzalez Catheter: Not present  Lines: None     Cardiac Monitoring: None  Code Status: Full Code      Clinically Significant Risk Factors Present on Admission                        # Anemia: based on hgb <11       # Obesity: Estimated body mass index is 30 kg/m  as calculated from the following:    Height as of this encounter: 1.676 m (5' 6\").    Weight as of this encounter: 84.3 kg (185 lb 13.6 oz).              Social Drivers of Health     Received from [employer] [facility] [employer], U-NOTE [facility]    Depression (PHQ-9)          Disposition Plan     Medically Ready for Discharge: Anticipated in 5+ Days             Elías Haro MD  Hospitalist Service  [facility]  Securely message with [email or web address] (more info)  Text page via [employer] Paging/Directory   ______________________________________________________________________    Interval History   Admitted last night, 4 out of 5 strength of bilateral legs persists along with numbness  Denies any respiratory symptoms  Overall deficits are stable  Awaiting neurology input regarding possible IVIG    Physical Exam   Vital Signs: Temp: 98  F (36.7  C) Temp src: Oral BP: 114/71 Pulse: 70   Resp: 20 SpO2: 100 % O2 Device: None (Room air)    Weight: 185 lbs 13.56 oz    General: Alert, awake, no acute distress.  HEENT: " NC/AT, eyes anicteric, external occular movements intact, face symmetric.   Cardiac: RRR, S1, S2.  No murmurs appreciated.  Pulmonary: Normal chest rise, normal work of breathing.  Lungs CTA BL  Abdomen: soft, non-tender, non-distended.  Bowel Sounds Present.  No guarding.  Extremities: no deformities.  Warm, well perfused.  Skin: no rashes or lesions noted.  Warm and Dry.  Neuro:  Speech clear.  Upper extremity coordination and strength grossly normal.  Has numbness to light touch in bilateral lower extremities and 4+ out of 5 strength of bilateral lower extremities.  Seems to be hyporeflexic as well.  Psych: Appropriate affect.      Medical Decision Making             Data

## 2024-12-12 NOTE — H&P
"[facility]    History and Physical - Hospitalist Service       Date of Admission:  12/11/2024    Assessment & Plan      Alycia Noe is a 29 year old female admitted on 12/11/2024. She has a past medical history significant for tobacco use disorder.  She presented to emergency room for a 1 month history of progressive bilateral lower extremity weakness and numbness.  Weakness and numbness now go from her feet all the way up to the upper thigh area on both legs.  Having difficulty walking because of weakness and numbness.  Finished a course of amoxicillin for suspected pneumonia yesterday.    Progressive bilateral lower extremity weakness and numbness.  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  -Symptoms present for 1 month.  -Have progressed to the point of being all the way up to upper thigh area bilaterally.  -Recently completed course of amoxicillin for suspected pneumonia.  -Denies any diarrhea over the past month.  -MRI lumbar spine and lumbar puncture with labs done in ER.  -Neurology consult.  -Add influenza, RSV, SARS-CoV-2 PCR's.  -If she were to develop any diarrhea, would check enteric bacteria and virus panel.    Tobacco use disorder.  -I did advise complete smoking cessation.  -Nicotine gum if needed.    Anemia.  -Mild.  Hemoglobin 11.2.  -Recheck CBC tomorrow.          Diet: Combination Diet Regular Diet Adult    DVT Prophylaxis: Pneumatic Compression Devices  Gonzalez Catheter: Not present  Lines: None     Cardiac Monitoring: None  Code Status: Full Code      Clinically Significant Risk Factors Present on Admission                             # Obesity: Estimated body mass index is 30 kg/m  as calculated from the following:    Height as of this encounter: 1.676 m (5' 6\").    Weight as of this encounter: 84.3 kg (185 lb 13.6 oz).              Disposition Plan     Medically Ready for Discharge: Anticipated in 2-4 Days           Tenzin Luo, DO  Hospitalist Service  [facility]" [facility]  Securely message with [employer] (more info)  Text page via [facility] Paging/Directory     ______________________________________________________________________    Chief Complaint   Weakness and numbness in legs.    History is obtained from the patient    History of Present Illness   Alycia Noe is a 29 year old female who has a past medical history significant for tobacco use disorder.  Has been having weakness and numbness in bilateral lower extremities for the past 1 month.  Weakness and numbness have slowly been progressing upper legs.  No travel from her ankle area all the way up to the upper thigh area on both legs.  Was diagnosed with suspected pneumonia little more than a week ago.  Completed a course of amoxicillin to treat pneumonia yesterday.  Has not noticed any fevers or chills.  No continued cough.  No dysuria.  Denies diarrhea in the past 2 months.  No other acute complaints.  Present emergency room for further evaluation.      Past Medical History    No past medical history on file.    Past Surgical History   No past surgical history on file.    Prior to Admission Medications   Prior to Admission Medications   Prescriptions Last Dose Informant Patient Reported? Taking?   amoxicillin (AMOXIL) 500 MG capsule 12/10/2024  No Yes   Sig: Take 1 capsule (500 mg) by mouth 2 times daily for 10 days.   cyanocobalamin (VITAMIN B-12) 500 MCG tablet 12/11/2024 Morning  Yes Yes   Sig: Take 500 mcg by mouth daily.   multivitamin w/minerals (THERA-VIT-M) tablet 12/11/2024 Morning  Yes Yes   Sig: Take 1 tablet by mouth daily.      Facility-Administered Medications: None        Allergies   No Known Allergies     Physical Exam   Vital Signs: Temp: 98.6  F (37  C) Temp src: Temporal BP: 116/83 Pulse: 83   Resp: 18 SpO2: 100 % O2 Device: None (Room air)    Weight: 185 lbs 13.56 oz    Gen:  NAD, A&Ox3.  Eyes:  PERRL, sclera anicteric.  OP:  MMM, no lesions.  Neck:  Supple.  CV:  Regular, no  murmurs.  Lung:  CTA b/l, normal effort.  Ab:  +BS, soft.  Skin:  Warm, dry to touch.  No rash.  Ext:  No pitting edema LE b/l.      Medical Decision Making       65 MINUTES SPENT BY ME on the date of service doing chart review, history, exam, documentation & further activities per the note.      Data     I have personally reviewed the following data over the past 24 hrs:    8.1  \   11.2 (L)   / 265     136 100 8.8 /  92   4.0 23 0.61 \     ALT: 20 AST: 19 AP: 60 TBILI: 0.2   ALB: 4.2 TOT PROTEIN: 7.9 LIPASE: N/A       Imaging results reviewed over the past 24 hrs:   Recent Results (from the past 24 hours)   MR Lumbar Spine w/o & w Contrast    Narrative    MRI LUMBAR SPINE WITHOUT AND WITH CONTRAST   12/11/2024 2:37 PM     HISTORY: Progressive bilateral lower extremity numbness/weakness; Low  back pain.    TECHNIQUE: Multiplanar multisequence MRI of the lumbar spine without  and with 8 mL Gadavist.     COMPARISON: None.     FINDINGS: There are 5 lumbar-type vertebral bodies assumed for the  purposes of this dictation. The tip of the conus terminates at the  level of L1-L2. No definite abnormal thickening or enhancement of the  cauda equina nerve roots. Subtle enhancing structure in the thecal sac  (series 8 image 9) is favored to be vascular in etiology, possibly the  anterior spinal artery.     Alignment of the lumbar spine is normal. No loss of vertebral body  height. There are no destructive osseous lesions.     Level by level as follows:     T12-L1: No loss of disc height. No significant disc herniation. Normal  facets. No spinal canal or neural foraminal narrowing.     L1-L2: No loss of disc height. No significant disc herniation. Normal  facets. No spinal canal or neural foraminal narrowing.     L2-L3: No loss of disc height. No significant disc herniation. Normal  facets. No spinal canal or neural foraminal narrowing.     L3-L4: No loss of disc height. No significant disc herniation. Normal  facets. No  spinal canal or neural foraminal narrowing.     L4-L5: No loss of disc height. No significant disc herniation. Normal  facets. No spinal canal or neural foraminal narrowing.     L5-S1: No loss of disc height. No significant disc herniation. Normal  facets. No spinal canal or neural foraminal narrowing.     Paraspinous soft tissues: Presumed prominent lymphatic structure  anterior to the L1 vertebral body.       Impression    IMPRESSION:  Unremarkable lumbar spine MRI. No significant  degenerative changes. No spinal canal or neural foraminal narrowing at  any level. No abnormal enhancement appreciated of the cauda equina  nerve roots.      ANAMARIA HUGHES MD         SYSTEM ID:  [id number]

## 2024-12-13 ENCOUNTER — APPOINTMENT (OUTPATIENT)
Dept: PHYSICAL THERAPY | Facility: CLINIC | Age: 29
End: 2024-12-13
Payer: COMMERCIAL

## 2024-12-13 ENCOUNTER — APPOINTMENT (OUTPATIENT)
Dept: MRI IMAGING | Facility: CLINIC | Age: 29
End: 2024-12-13
Attending: PSYCHIATRY & NEUROLOGY
Payer: COMMERCIAL

## 2024-12-13 PROBLEM — E53.8 VITAMIN B12 DEFICIENCY (NON ANEMIC): Status: ACTIVE | Noted: 2024-12-13

## 2024-12-13 PROBLEM — R29.898 BILATERAL LEG WEAKNESS: Status: RESOLVED | Noted: 2024-12-11 | Resolved: 2024-12-13

## 2024-12-13 LAB
ANA SER QL IF: NEGATIVE
B BURGDOR IGG+IGM SER QL: 0.67
BACTERIA UR CULT: NORMAL
HIV 1+2 AB+HIV1 P24 AG SERPL QL IA: NONREACTIVE
IRON BINDING CAPACITY (ROCHE): 306 UG/DL (ref 240–430)
IRON SATN MFR SERPL: 6 % (ref 15–46)
IRON SERPL-MCNC: 18 UG/DL (ref 37–145)
LOCATION OF TASK: NORMAL
PROT PATTERN SERPL IFE-IMP: NORMAL
T PALLIDUM AB SER QL: NONREACTIVE

## 2024-12-13 PROCEDURE — 83825 ASSAY OF MERCURY: CPT | Performed by: PSYCHIATRY & NEUROLOGY

## 2024-12-13 PROCEDURE — 99232 SBSQ HOSP IP/OBS MODERATE 35: CPT | Performed by: INTERNAL MEDICINE

## 2024-12-13 PROCEDURE — 120N000001 HC R&B MED SURG/OB

## 2024-12-13 PROCEDURE — 97530 THERAPEUTIC ACTIVITIES: CPT | Mod: GP

## 2024-12-13 PROCEDURE — 250N000013 HC RX MED GY IP 250 OP 250 PS 637: Performed by: INTERNAL MEDICINE

## 2024-12-13 PROCEDURE — 82175 ASSAY OF ARSENIC: CPT | Performed by: PSYCHIATRY & NEUROLOGY

## 2024-12-13 PROCEDURE — 36415 COLL VENOUS BLD VENIPUNCTURE: CPT | Performed by: PSYCHIATRY & NEUROLOGY

## 2024-12-13 PROCEDURE — 72157 MRI CHEST SPINE W/O & W/DYE: CPT

## 2024-12-13 PROCEDURE — A9585 GADOBUTROL INJECTION: HCPCS | Performed by: PSYCHIATRY & NEUROLOGY

## 2024-12-13 PROCEDURE — 250N000011 HC RX IP 250 OP 636: Performed by: PSYCHIATRY & NEUROLOGY

## 2024-12-13 PROCEDURE — 255N000002 HC RX 255 OP 636: Performed by: PSYCHIATRY & NEUROLOGY

## 2024-12-13 PROCEDURE — 72156 MRI NECK SPINE W/O & W/DYE: CPT

## 2024-12-13 RX ORDER — GADOBUTROL 604.72 MG/ML
8 INJECTION INTRAVENOUS ONCE
Status: COMPLETED | OUTPATIENT
Start: 2024-12-13 | End: 2024-12-13

## 2024-12-13 RX ADMIN — GADOBUTROL 8 ML: 604.72 INJECTION INTRAVENOUS at 15:33

## 2024-12-13 RX ADMIN — Medication 1 TABLET: at 09:37

## 2024-12-13 RX ADMIN — CYANOCOBALAMIN TAB 500 MCG 500 MCG: 500 TAB at 09:37

## 2024-12-13 RX ADMIN — CYANOCOBALAMIN 1000 MCG: 1000 INJECTION, SOLUTION INTRAMUSCULAR; SUBCUTANEOUS at 00:07

## 2024-12-13 ASSESSMENT — ACTIVITIES OF DAILY LIVING (ADL)
ADLS_ACUITY_SCORE: 39
ADLS_ACUITY_SCORE: 35
ADLS_ACUITY_SCORE: 35
ADLS_ACUITY_SCORE: 39
ADLS_ACUITY_SCORE: 35
ADLS_ACUITY_SCORE: 39
ADLS_ACUITY_SCORE: 35
ADLS_ACUITY_SCORE: 39
ADLS_ACUITY_SCORE: 35
ADLS_ACUITY_SCORE: 35
ADLS_ACUITY_SCORE: 39
ADLS_ACUITY_SCORE: 39
ADLS_ACUITY_SCORE: 35

## 2024-12-13 NOTE — SIGNIFICANT EVENT
Significant Event Note    Time of event: 6:08 PM December 12, 2024    Description of event:  Received a phone call from nursing stating that the patient is desirous of leaving AMA as neurology has not consulted yet on the patient and they feel nothing is being done for them    Plan:  Medical records reviewed, would encourage the patient not to leave AMA but given that they are not holdable it would be their decision to do so    Discussed with: bedside nurse    Makenzie Amezquita MD

## 2024-12-13 NOTE — PROGRESS NOTES
RN called [facility] of Neurology x3 requesting a page from MD of when they will round with pt. No response from Neuro still. Oncoming RN notified of the pages and aware of pt current concerns of high fall risk and prior request to leave AMA.

## 2024-12-13 NOTE — PLAN OF CARE
"End of Shift Summary  For vital signs and complete assessments, please see documentation flowsheets.     Pertinent assessments: A&O. VSS on RA. LS clear- denies SOB. BS active- denies nausea. No BM this shift. Voids without difficulty. Denies pain. BLE numbness. Pt states it is the same- not getting any better or worse so far. Did not get OOB this shift. Alarms on for safety. Reg diet. PIV SL.    Major Shift Events: uneventful     Treatment Plan: symptom management, continue to monitor BLE CMS, fall precautions, PT and neurology following    Bedside Nurse: Sesar Khan RN       Problem: Adult Inpatient Plan of Care  Goal: Plan of Care Review  Description: The Plan of Care Review/Shift note should be completed every shift.  The Outcome Evaluation is a brief statement about your assessment that the patient is improving, declining, or no change.  This information will be displayed automatically on your shift  note.  Outcome: Progressing  Flowsheets (Taken 12/13/2024 8117)  Outcome Evaluation: VSS on RA. Continues to have BLE numbness/weakness. Continue to monitor. Denies pain/nausea/SOB.  Plan of Care Reviewed With: patient  Overall Patient Progress: no change  Goal: Patient-Specific Goal (Individualized)  Description: You can add care plan individualizations to a care plan. Examples of Individualization might be:  \"Parent requests to be called daily at 9am for status\", \"I have a hard time hearing out of my right ear\", or \"Do not touch me to wake me up as it startles  me\".  Outcome: Progressing  Goal: Absence of Hospital-Acquired Illness or Injury  Outcome: Progressing  Intervention: Identify and Manage Fall Risk  Recent Flowsheet Documentation  Taken 12/12/2024 1438 by Sesar Khan, RN  Safety Promotion/Fall Prevention:   supervised activity   safety round/check completed   room organization consistent   room near nurse's station   patient and family education  Intervention: Prevent Skin Injury  Recent Flowsheet " Documentation  Taken 12/12/2024 2155 by Sesar Khan RN  Body Position: position changed independently  Intervention: Prevent Infection  Recent Flowsheet Documentation  Taken 12/12/2024 2155 by Sesar Khan RN  Infection Prevention:   single patient room provided   rest/sleep promoted   hand hygiene promoted   cohorting utilized  Goal: Optimal Comfort and Wellbeing  Outcome: Progressing  Goal: Readiness for Transition of Care  Outcome: Progressing     Problem: Fall Injury Risk  Goal: Absence of Fall and Fall-Related Injury  Outcome: Progressing  Intervention: Identify and Manage Contributors  Recent Flowsheet Documentation  Taken 12/12/2024 2155 by Sesar Khan RN  Medication Review/Management: medications reviewed  Intervention: Promote Injury-Free Environment  Recent Flowsheet Documentation  Taken 12/12/2024 2155 by Sesar Khan RN  Safety Promotion/Fall Prevention:   supervised activity   safety round/check completed   room organization consistent   room near nurse's station   patient and family education     Problem: Pain Acute  Goal: Optimal Pain Control and Function  Outcome: Progressing  Intervention: Prevent or Manage Pain  Recent Flowsheet Documentation  Taken 12/12/2024 2155 by Sesra Khan RN  Medication Review/Management: medications reviewed     Problem: Skin Injury Risk Increased  Goal: Skin Health and Integrity  Outcome: Progressing  Intervention: Plan: Nurse Driven Intervention: Moisture Management  Recent Flowsheet Documentation  Taken 12/12/2024 2155 by Sesar Khan RN  Moisture Interventions: Encourage regular toileting  Intervention: Optimize Skin Protection  Recent Flowsheet Documentation  Taken 12/12/2024 2155 by Sesar Khna RN  Activity Management: activity adjusted per tolerance  Head of Bed (HOB) Positioning: HOB at 20-30 degrees       Goal Outcome Evaluation:      Plan of Care Reviewed With: patient    Overall Patient Progress: no changeOverall Patient Progress: no  change    Outcome Evaluation: VSS on RA. Continues to have BLE numbness/weakness. Continue to monitor. Denies pain/nausea/SOB.

## 2024-12-13 NOTE — CARE PLAN
12/12/24 1800   Fall Event   Patient Assessed By  --    Name of Provider Notified  --    Family/Designated Caregiver Notified of Fall  --    Fall Prevention Plan Updated  --    Name of Family/Designated Caregiver Notified  --

## 2024-12-13 NOTE — PLAN OF CARE
Pertinent assessments: A&O, up ax1 belt and walker. Denies pain sob or nausea. Some numbness to BLE. Eating well, cont menses. Pt educated regarding fall precautions (bed alarm on, call light , walker/gait belt within reach).     Major Shift Events : MRI planned for this afternoon (delayed d/t machine reboot). 24hr urine collection started today at 0813.     Treatment Plan: Neuro following. Awaiting further workup for proper tx.     Bedside Nurse: Nicole Hardin RN     Problem: Adult Inpatient Plan of Care  Goal: Plan of Care Review  Description: The Plan of Care Review/Shift note should be completed every shift.  The Outcome Evaluation is a brief statement about your assessment that the patient is improving, declining, or no change.  This information will be displayed automatically on your shift  note.  Flowsheets (Taken 12/13/2024 1455)  Outcome Evaluation: Treatment Plan: Neuro following. Awaiting further workup for proper tx.  Plan of Care Reviewed With: patient  Overall Patient Progress: no change  Goal: Absence of Hospital-Acquired Illness or Injury  Intervention: Prevent Infection  Recent Flowsheet Documentation  Taken 12/13/2024 1017 by Nicole Hardin RN  Infection Prevention:   single patient room provided   rest/sleep promoted   hand hygiene promoted   cohorting utilized     Problem: Skin Injury Risk Increased  Goal: Skin Health and Integrity  Intervention: Optimize Skin Protection  Recent Flowsheet Documentation  Taken 12/13/2024 0900 by Nicole Hardin RN  Activity Management: ambulated to bathroom   Goal Outcome Evaluation:      Plan of Care Reviewed With: patient    Overall Patient Progress: no changeOverall Patient Progress: no change    Outcome Evaluation: Treatment Plan: Neuro following. Awaiting further workup for proper tx.

## 2024-12-13 NOTE — PROGRESS NOTES
[facility]    Medicine Progress Note - Hospitalist Service    Date of Admission:  12/11/2024    Assessment & Plan     Alycia Noe is a 29 year old female admitted on 12/11/2024. She has a past medical history significant for tobacco use disorder.  She presented to the [facility] emergency room on 12/11/2024 due to 1 month history of progressive bilateral lower extremity weakness and numbness.  Weakness and numbness now go from her feet all the way up to the upper thigh area on both legs.  Having difficulty walking because of weakness and numbness.  Finished a course of amoxicillin for suspected pneumonia the day prior to admission.  At this point she denies any respiratory symptoms.    Here she was hemodynamically stable.  Lab workup was notable for mild anemia with a hemoglobin of 11.2 with grossly normal kidney function and electrolytes, white blood count, glucose, negative pregnancy test.  Urine analysis is abnormal with large blood, small leukocyte esterase and 28 white cells.  COVID-19, influenza and RSV testing was negative.  Lumbar puncture performed in the ER was negative for evidence of infection and protein was actually somewhat low at 13.7.  Fluid was clear with only 1 nucleated cell and glucose of 60.  MRI lumbar spine was unremarkable.    There is concern for Guillain-Barré syndrome versus other neuropathic process based on the history and workup so far.  Neurology consulted as we suspect she may need IVIG.    Regarding her somewhat abnormal urine sample watching off antibiotics and awaiting urine culture results.    She is B12 deficient and this is being replaced.    Awaiting MRI of C and T-spine.     Progressive bilateral lower extremity weakness and numbness.  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  -Have progressed to the point  "of being all the way up to upper thigh area bilaterally.  -Recently completed course of amoxicillin for suspected pneumonia.  -Denies any diarrhea over the past month.  -MRI lumbar spine and lumbar puncture with labs done in ER as above.  -Neurology consult.  Question initiation of IVIG.  Plasma exchange could be a consideration as well but would require transfer to a different facility.  -If she were to develop any diarrhea, would check enteric bacteria and virus panel.  -PT consulted  -Replacing B12  -EMG  -Check vitamin D  -Lyme, treponema antibody, HIV all negative    B12 deficiency: Recently diagnosed.  Given IM B12 on 12/12, now daily oral replacement.  ?contributory to presentation w/ ?  Subacute combined degeneration  -Weekly IM B12 injections ordered by neurology     Tobacco use disorder.  -advise complete smoking cessation.  -Nicotine gum if needed.     Anemia: Normocytic to borderline microcytic.  Doubt directly related to B12 as a result  -Mild.  Hemoglobin 11.2 upon presentation  -Check iron studies  -Replacing B12        Diet: Combination Diet Regular Diet Adult    DVT Prophylaxis: Pneumatic Compression Devices  Gonzalez Catheter: Not present  Lines: None     Cardiac Monitoring: None  Code Status: Full Code      Clinically Significant Risk Factors                              # Obesity: Estimated body mass index is 30 kg/m  as calculated from the following:    Height as of this encounter: 1.676 m (5' 6\").    Weight as of this encounter: 84.3 kg (185 lb 13.6 oz)., PRESENT ON ADMISSION            Social Drivers of Health     Received from [facility], [facility]    Depression (PHQ-9)          Disposition Plan     Medically Ready for Discharge: Anticipated in 5+ Days             Elías Haro MD  Hospitalist Service  [facility]  Securely message with [employer] (more info)  Text page via [facility] Paging/Directory "   ______________________________________________________________________    Interval History     MRI consult appreciated, awaiting MRI C and T-spine, EMG  Started on B12 replacement  No real change in symptoms  Still very unsteady with significant lower extremity weakness    Physical Exam   Vital Signs: Temp: 97.8  F (36.6  C) Temp src: Oral BP: 99/53 Pulse: 67   Resp: 20 SpO2: 99 % O2 Device: None (Room air)    Weight: 185 lbs 13.56 oz    General: Alert, awake, no acute distress.  HEENT: NC/AT, eyes anicteric, external occular movements intact, face symmetric.   Cardiac: RRR, S1, S2.  No murmurs appreciated.  Pulmonary: Normal chest rise, normal work of breathing.  Lungs CTA BL  Abdomen: soft, non-tender, non-distended.  Bowel Sounds Present.  No guarding.  Extremities: no deformities.  Warm, well perfused.  Skin: no rashes or lesions noted.  Warm and Dry.  Neuro:  Speech clear.  Upper extremity coordination and strength grossly normal.  Has numbness to light touch in bilateral lower extremities and 4+ out of 5 strength of bilateral lower extremities.  Seems to be hyporeflexic as well.  Psych: Appropriate affect.      Medical Decision Making             Data

## 2024-12-13 NOTE — CARE PLAN
12/12/24 1852   Fall Event   Patient Assessed By nurse   Name of Provider Notified Makenzie Amezquita   Family/Designated Caregiver Notified of Fall Yes   Fall Prevention Plan Updated Yes   Name of Family/Designated Caregiver Notified pt to notify, on phone during assesment

## 2024-12-13 NOTE — PLAN OF CARE
"Pertinent assessments: A&O, RN did not visualize pt get oob, pt worked with therapies, rec ARU. Received Ibuprofen for back pain r/t lumbar puncture 12/11. Endorsed 'frozen' feeling from shin down, good dorsiflexion and plantarflexion in ble, some numbness present.     Major Shift Events Awaiting neuro to see pt. Several pages sent with no response, md aware. Pt agreed to stay the night pending neuro seeing her, then reevaulate in am.     Addendum: Pt found on bathroom floor, used emergency call light to alert staff. Prior to event pt requesting to leave AMA, pt refused bed alarm. Pt received education to call when wanting to get out of bed but did not at this time, rn was told she 'can do it' herself when asked how pt will get around if she leaves ama. No apparent injuries, pt denied pain or dizziness. VSS MD paged. Pt now in correspondence to fall precautions.     Treatment Plan: Cont to monitor ble neuros. Pain and symptom management    Bedside Nurse: Nicole Hardin RN       Problem: Adult Inpatient Plan of Care  Goal: Plan of Care Review  Description: The Plan of Care Review/Shift note should be completed every shift.  The Outcome Evaluation is a brief statement about your assessment that the patient is improving, declining, or no change.  This information will be displayed automatically on your shift  note.  Outcome: Not Progressing  Flowsheets (Taken 12/12/2024 8342)  Outcome Evaluation: Treatment Plan: Cont to monitor ble neuros. Pain and symptom management  Plan of Care Reviewed With: patient  Overall Patient Progress: no change  Goal: Patient-Specific Goal (Individualized)  Description: You can add care plan individualizations to a care plan. Examples of Individualization might be:  \"Parent requests to be called daily at 9am for status\", \"I have a hard time hearing out of my right ear\", or \"Do not touch me to wake me up as it startles  me\".  Outcome: Not Progressing  Goal: Absence of Hospital-Acquired " Illness or Injury  Outcome: Not Progressing  Intervention: Identify and Manage Fall Risk  Recent Flowsheet Documentation  Taken 12/12/2024 1029 by Nicole Hardin RN  Safety Promotion/Fall Prevention:   increased rounding and observation   increase visualization of patient   nonskid shoes/slippers when out of bed   room near nurse's station  Intervention: Prevent Infection  Recent Flowsheet Documentation  Taken 12/12/2024 1029 by Nicole Hardin RN  Infection Prevention: rest/sleep promoted  Goal: Optimal Comfort and Wellbeing  Outcome: Not Progressing  Goal: Readiness for Transition of Care  Outcome: Not Progressing     Problem: Fall Injury Risk  Goal: Absence of Fall and Fall-Related Injury  Outcome: Not Progressing  Intervention: Identify and Manage Contributors  Recent Flowsheet Documentation  Taken 12/12/2024 1029 by Nicole Hardin RN  Medication Review/Management: medications reviewed  Intervention: Promote Injury-Free Environment  Recent Flowsheet Documentation  Taken 12/12/2024 1029 by Nicole Hardin RN  Safety Promotion/Fall Prevention:   increased rounding and observation   increase visualization of patient   nonskid shoes/slippers when out of bed   room near nurse's station     Problem: Pain Acute  Goal: Optimal Pain Control and Function  Outcome: Not Progressing  Intervention: Prevent or Manage Pain  Recent Flowsheet Documentation  Taken 12/12/2024 1029 by Nicole Hardin RN  Medication Review/Management: medications reviewed   Goal Outcome Evaluation:      Plan of Care Reviewed With: patient    Overall Patient Progress: no changeOverall Patient Progress: no change    Outcome Evaluation: Treatment Plan: Cont to monitor ble neuros. Pain and symptom management

## 2024-12-14 PROBLEM — E60 ZINC DEFICIENCY: Status: ACTIVE | Noted: 2024-12-14

## 2024-12-14 PROBLEM — E55.9 VITAMIN D DEFICIENCY: Status: ACTIVE | Noted: 2024-12-14

## 2024-12-14 PROBLEM — R20.0 BILATERAL LEG NUMBNESS: Status: RESOLVED | Noted: 2024-12-11 | Resolved: 2024-12-14

## 2024-12-14 LAB
ALB CSF/SERPL: 2 RATIO
ALBUMIN CSF-MCNC: 7 MG/DL
ALBUMIN SERPL-MCNC: 3542 MG/DL
CK SERPL-CCNC: 92 U/L (ref 26–192)
COPPER SERPL-MCNC: 121.2 UG/DL
CRP SERPL-MCNC: 3.55 MG/L
IGG CSF-MCNC: 1.4 MG/DL
IGG SERPL-MCNC: 1418 MG/DL
IGG SYNTH RATE SER+CSF CALC-MRATE: <0 MG/D
IGG/ALB CLEAR SER+CSF-RTO: 0.5 RATIO
IGG/ALB CSF: 0.2 RATIO
OLIGOCLONAL BANDS CSF ELPH-IMP: NEGATIVE
OLIGOCLONAL BANDS CSF ELPH-IMP: NORMAL
OLIGOCLONAL BANDS CSF IEF: 0 BANDS
VIT D+METAB SERPL-MCNC: 16 NG/ML (ref 20–50)
ZINC SERPL-MCNC: 37.1 UG/DL

## 2024-12-14 PROCEDURE — 83825 ASSAY OF MERCURY: CPT | Performed by: PSYCHIATRY & NEUROLOGY

## 2024-12-14 PROCEDURE — 250N000013 HC RX MED GY IP 250 OP 250 PS 637: Performed by: INTERNAL MEDICINE

## 2024-12-14 PROCEDURE — 250N000011 HC RX IP 250 OP 636: Performed by: STUDENT IN AN ORGANIZED HEALTH CARE EDUCATION/TRAINING PROGRAM

## 2024-12-14 PROCEDURE — 250N000013 HC RX MED GY IP 250 OP 250 PS 637: Performed by: STUDENT IN AN ORGANIZED HEALTH CARE EDUCATION/TRAINING PROGRAM

## 2024-12-14 PROCEDURE — 82306 VITAMIN D 25 HYDROXY: CPT | Performed by: INTERNAL MEDICINE

## 2024-12-14 PROCEDURE — 250N000013 HC RX MED GY IP 250 OP 250 PS 637: Performed by: PSYCHIATRY & NEUROLOGY

## 2024-12-14 PROCEDURE — 86335 IMMUNFIX E-PHORSIS/URINE/CSF: CPT | Performed by: PATHOLOGY

## 2024-12-14 PROCEDURE — 120N000001 HC R&B MED SURG/OB

## 2024-12-14 PROCEDURE — 36415 COLL VENOUS BLD VENIPUNCTURE: CPT | Performed by: INTERNAL MEDICINE

## 2024-12-14 PROCEDURE — 99232 SBSQ HOSP IP/OBS MODERATE 35: CPT | Performed by: STUDENT IN AN ORGANIZED HEALTH CARE EDUCATION/TRAINING PROGRAM

## 2024-12-14 PROCEDURE — 83655 ASSAY OF LEAD: CPT | Performed by: PSYCHIATRY & NEUROLOGY

## 2024-12-14 PROCEDURE — 86335 IMMUNFIX E-PHORSIS/URINE/CSF: CPT | Mod: 26 | Performed by: PATHOLOGY

## 2024-12-14 RX ORDER — FERROUS SULFATE 325(65) MG
325 TABLET ORAL EVERY OTHER DAY
Status: DISCONTINUED | OUTPATIENT
Start: 2024-12-14 | End: 2024-12-15 | Stop reason: HOSPADM

## 2024-12-14 RX ORDER — VITAMIN B COMPLEX
50 TABLET ORAL DAILY
Status: DISCONTINUED | OUTPATIENT
Start: 2024-12-14 | End: 2024-12-15 | Stop reason: HOSPADM

## 2024-12-14 RX ORDER — CYANOCOBALAMIN 1000 UG/ML
1000 INJECTION, SOLUTION INTRAMUSCULAR; SUBCUTANEOUS DAILY
Status: DISCONTINUED | OUTPATIENT
Start: 2024-12-14 | End: 2024-12-15 | Stop reason: HOSPADM

## 2024-12-14 RX ORDER — PREGABALIN 25 MG/1
25 CAPSULE ORAL 3 TIMES DAILY
Status: DISCONTINUED | OUTPATIENT
Start: 2024-12-14 | End: 2024-12-15 | Stop reason: HOSPADM

## 2024-12-14 RX ORDER — ZINC SULFATE 50(220)MG
220 CAPSULE ORAL DAILY
Status: DISCONTINUED | OUTPATIENT
Start: 2024-12-14 | End: 2024-12-15 | Stop reason: HOSPADM

## 2024-12-14 RX ADMIN — FERROUS SULFATE TAB 325 MG (65 MG ELEMENTAL FE) 325 MG: 325 (65 FE) TAB at 18:02

## 2024-12-14 RX ADMIN — ZINC SULFATE 220 MG (50 MG) CAPSULE 220 MG: CAPSULE at 18:54

## 2024-12-14 RX ADMIN — PREGABALIN 25 MG: 25 CAPSULE ORAL at 22:18

## 2024-12-14 RX ADMIN — Medication 1 TABLET: at 10:01

## 2024-12-14 RX ADMIN — PREGABALIN 25 MG: 25 CAPSULE ORAL at 18:54

## 2024-12-14 RX ADMIN — CYANOCOBALAMIN 1000 MCG: 1000 INJECTION, SOLUTION INTRAMUSCULAR; SUBCUTANEOUS at 20:33

## 2024-12-14 RX ADMIN — Medication 50 MCG: at 18:02

## 2024-12-14 RX ADMIN — CYANOCOBALAMIN TAB 500 MCG 500 MCG: 500 TAB at 10:01

## 2024-12-14 ASSESSMENT — ACTIVITIES OF DAILY LIVING (ADL)
ADLS_ACUITY_SCORE: 49
ADLS_ACUITY_SCORE: 40
ADLS_ACUITY_SCORE: 39
ADLS_ACUITY_SCORE: 40
ADLS_ACUITY_SCORE: 39
ADLS_ACUITY_SCORE: 39
ADLS_ACUITY_SCORE: 40
ADLS_ACUITY_SCORE: 49
ADLS_ACUITY_SCORE: 39
ADLS_ACUITY_SCORE: 40
ADLS_ACUITY_SCORE: 40
ADLS_ACUITY_SCORE: 39
ADLS_ACUITY_SCORE: 40
ADLS_ACUITY_SCORE: 49
ADLS_ACUITY_SCORE: 40
ADLS_ACUITY_SCORE: 49
ADLS_ACUITY_SCORE: 39
ADLS_ACUITY_SCORE: 40
ADLS_ACUITY_SCORE: 49
ADLS_ACUITY_SCORE: 39

## 2024-12-14 NOTE — CARE PLAN
12/14/24 1025   Fall Event   Patient Assessed By nurse;provider   Name of Provider Notified Cami Bell   Fall Prevention Plan Updated Yes

## 2024-12-14 NOTE — PROGRESS NOTES
"Neurology Progress Note.    Assessment and Plan:  This is a 29 year old woman who presents with subacute progressive  bilateral predominantly proximal  lower extremities as well as left upper distal extremity weakness, loss of proprioception. Based on exam concern for possible spinal cord process (myelopathy - could be subacute combined degeneration from recently determined vitamin B12 deficiency vs other metabolic or inflammatory process) vs central and peripheral (myeloneuropathy).  Patient also noted to have iron deficiency.  Her exam today is actually better with strength (not with sensory) which means that either she is responding to therapy or has additional psychogenic component?  MRI cervical and thoracic spine were normal.   -pending additional blood work  -EMG when able  -patient treated with B12 as injection given concern for severe neurological condition and low level  -will add heavy metal panel  -therapies and rehab eval    My colleague will follow patient over weekend.     Thank you for allowing me to participate in cares of this patient. Please contact me with any questions of concerns (pager [phone]).     Subjective: No acute events overnight.  Today patient reported that she is the same. Complained of no IV treatments.     Objective:  /75 (BP Location: Right arm)   Pulse 80   Temp 99  F (37.2  C) (Oral)   Resp 18   Ht 1.676 m (5' 6\")   Wt 84.3 kg (185 lb 13.6 oz)   LMP 12/11/2024 (Approximate)   SpO2 99%   BMI 30.00 kg/m    General: no acute distress  Neuro:  Today patient reported that she can't lift her legs. The rest of the muscles were 5/5! Then patient was able to stand by the bed which implies at least 4/5 strength of her hip flexors. Patient did lost her balance with Romberg    I have personally reviewed all the labs and imaging studies. Pertinent findings:   MRI C-   IMPRESSION:  1.  Normal MRI of the clinical spine within limits of a mildly motion compromised " exam.  IMPRESSION:  1.  Normal MRI of thoracic spine.      PENDING: B1, E, CK, Zn, UPEP - serum  OCB in CSF    Component      Latest Ref Rng 12/12/2024  5:59 AM 12/12/2024  10:56 PM   Iron      37 - 145 ug/dL  18 (L)    Iron Binding Capacity      240 - 430 ug/dL  306    Iron Sat Index      15 - 46 %  6 (L)    LUZ MARIA interpretation      Negative   Negative    TSH      0.30 - 4.20 uIU/mL 0.86     HIV Antigen Antibody Combo      Nonreactive  Nonreactive     Treponema Antibodies      Nonreactive   Nonreactive    Lyme Disease Antibodies Serum      <0.90   0.67       Legend:  (L) Low    Total time of visit: 35 minutes with the time spent on chart review, imaging and lab results review, and more than 50 % of the time spent on coordination of cares and face-to-face time with the patient including counseling regarding pathophysiology of the above conditions, results of the tests and further directions of care.     Sharmin Beatty MD  [facility]

## 2024-12-14 NOTE — SIGNIFICANT EVENT
Patient had assistance to the shower chair with al robertson, pt refusing gait belt. Pt was getting out of the shower- refusing gait belt and assistance, refusing to follow writer's  commands to sit down. Pt began to stand, and writer instructed patient to sit down. Patient fell in the bathroom assisted by RN. Pt denied hitting head or denied other injury, or  pain. MD was outside the room, and notified. MD notified if assessment was needed prior to transfer- no assessment needed prior to transfer per MD. Assist of three to lift patient with sling back to bed. Temp: 98.4  F (36.9  C) Temp src: Oral BP: 125/83 Pulse: 82   Resp: 20 SpO2: 97 % O2 Device: None (Room air)   Education provided again to patient about fall safety, and the need to call for help and utilize assistance.

## 2024-12-14 NOTE — CONSULTS
Care Management Initial Consult    General Information  Assessment completed with: Patient,    Type of CM/SW Visit: Initial Assessment    Primary Care Provider verified and updated as needed:  (no PCP, CCRC task submitted)   Readmission within the last 30 days: no previous admission in last 30 days      Reason for Consult: care coordination/care conference, discharge planning           Communication Assessment  Patient's communication style: spoken language (English or Bilingual)    Hearing Difficulty or Deaf: no   Wear Glasses or Blind: no    Cognitive  Cognitive/Neuro/Behavioral: .WDL except, mood/behavior                      Living Environment:   People in home: child(chago), dependent, parent(s)     Current living Arrangements: apartment             Family/Social Support:  Care provided by: self       Current Resources:   Patient receiving home care services: No   Equipment currently used at home: none  Supplies currently used at home: None    Employment/Financial:  Employment Status: employed full-time         Lifestyle & Psychosocial Needs:  Social Drivers of Health     Food Insecurity: Low Risk  (12/11/2024)    Food Insecurity     Within the past 12 months, did you worry that your food would run out before you got money to buy more?: No     Within the past 12 months, did the food you bought just not last and you didn t have money to get more?: No   Depression: Unknown (12/4/2020)    Received from [facility], [facility]    Depression (PHQ-9)     Last PHQ-9 Score: Not on file     Thoughts of self harm: Not on file   Housing Stability: Low Risk  (12/11/2024)    Housing Stability     Do you have housing? : Yes     Are you worried about losing your housing?: No   Tobacco Use: Low Risk  (12/11/2024)    Patient History     Smoking Tobacco Use: Never     Smokeless Tobacco Use: Never     Passive Exposure: Never   Financial Resource Strain: Low Risk  (12/11/2024)    Financial  "Resource Strain     Within the past 12 months, have you or your family members you live with been unable to get utilities (heat, electricity) when it was really needed?: No   Alcohol Use: Not At Risk (12/4/2020)    Received from [facility]    AUDIT-C     Frequency of Alcohol Consumption: Never     Average Number of Drinks: Not on file     Frequency of Binge Drinking: Not on file   Transportation Needs: Low Risk  (12/11/2024)    Transportation Needs     Within the past 12 months, has lack of transportation kept you from medical appointments, getting your medicines, non-medical meetings or appointments, work, or from getting things that you need?: No   Physical Activity: Not on file   Interpersonal Safety: Low Risk  (12/11/2024)    Interpersonal Safety     Do you feel physically and emotionally safe where you currently live?: Yes     Within the past 12 months, have you been hit, slapped, kicked or otherwise physically hurt by someone?: No     Within the past 12 months, have you been humiliated or emotionally abused in other ways by your partner or ex-partner?: No   Stress: Not on file   Social Connections: Not on file   Health Literacy: Not on file       Discussed  Partnership in Safe Discharge Planning  document with patient/family: No    Additional Information:  CM/SW consulted for discharge planning. ARU is the current recommendation for discharge placement, per PT. Patient presented with \"progressive bilateral lower extremity weakness and numbness, suspected Guillain-[place] syndrome,\" per H&P.    CM met with patient at the bedside who shared she lives with her children and mother in an apartment. She denied using medical equipment including oxygen at baseline. She reported that she is independent with ADLs and med management at baseline.     She denied having a current PCP and was agreeable to CM submitting CCRC task to establish w a female [facility] PCP, preferably at the [facility] " location. CCRC task submitted.     CM shared ARU recs for discharge, described what ARU is and how long a typical stay would be. ARU packet left at the bedside. Patient stated she is not interested in discharging to ARU at this time, she wants to see how she improves, hospitalist updated.     She feels she has reliable family transport for discharge.       Next Steps: Follow for PT recs, discharge planning needs    Bisi Hunt RN, BSN  Inpatient Care Coordination  [facility]  [phone]

## 2024-12-14 NOTE — PROGRESS NOTES
Neurology Note  [facility]    Patient Name: Alycia Noe  MRN: [id number]  : 1995  Visit Date: 2024    Subjective:  Continue to has BLE weakness and subjective tingling .  Walked to the bathroom and fell     Past Medical History:  No past medical history on file.  Past Surgical History:  No past surgical history on file.  Medications:  No current outpatient medications on file.     Allergies:  No Known Allergies  Family History:  No family history on file.  Social History:  Social History     Socioeconomic History    Marital status: Single     Spouse name: Not on file    Number of children: Not on file    Years of education: Not on file    Highest education level: Not on file   Occupational History    Not on file   Tobacco Use    Smoking status: Never     Passive exposure: Never    Smokeless tobacco: Never   Substance and Sexual Activity    Alcohol use: Not on file    Drug use: Not on file    Sexual activity: Not on file   Other Topics Concern    Not on file   Social History Narrative    Not on file     Social Drivers of Health     Financial Resource Strain: Low Risk  (2024)    Financial Resource Strain     Within the past 12 months, have you or your family members you live with been unable to get utilities (heat, electricity) when it was really needed?: No   Food Insecurity: Low Risk  (2024)    Food Insecurity     Within the past 12 months, did you worry that your food would run out before you got money to buy more?: No     Within the past 12 months, did the food you bought just not last and you didn t have money to get more?: No   Transportation Needs: Low Risk  (2024)    Transportation Needs     Within the past 12 months, has lack of transportation kept you from medical appointments, getting your medicines, non-medical meetings or appointments, work, or from getting things that you need?: No   Physical Activity: Not on file   Stress: Not on file  "  Social Connections: Not on file   Interpersonal Safety: Low Risk  (12/11/2024)    Interpersonal Safety     Do you feel physically and emotionally safe where you currently live?: Yes     Within the past 12 months, have you been hit, slapped, kicked or otherwise physically hurt by someone?: No     Within the past 12 months, have you been humiliated or emotionally abused in other ways by your partner or ex-partner?: No   Housing Stability: Low Risk  (12/11/2024)    Housing Stability     Do you have housing? : Yes     Are you worried about losing your housing?: No         Vital Signs:  /74 (BP Location: Left arm)   Pulse 86   Temp 97.8  F (36.6  C) (Oral)   Resp 16   Ht 1.676 m (5' 6\")   Wt 84.3 kg (185 lb 13.6 oz)   LMP 12/11/2024 (Approximate)   SpO2 96%   BMI 30.00 kg/m        Neurological examination  Mental Status: The patient is alert and oriented. Recent memory is normal. The person is  attentive with normal concentration. Language is fluent. Speech is of normal tre and  character. The speech is nondysarthric. Fund of knowledge appears normal  Cranial Nerve I: Not tested.  Cranial Nerve II: The pupils are 3 mm, equally round and reactive to light.  Cranial Nerves III, IV, VI: The extraocular movements are full in all directions of gaze without  ophthalmoplegia or nystagmus.  Cranial Nerve V: Light touch is intact and symmetric in the V1, V2, V3 divisions of both  trigeminal nerves.  Cranial Nerve VII: Facial movements are symmetric.    Muscle Strength: The strength was 5-/5 in upper ext bilaterally.  BLE : 3/5    Reflexes: The reflexes are hyporeflexic 0-1 +  for biceps, patellar tendon reflexes bilaterally and  symmetrically.  Sensory: The sensory examination is normal for light touch bilaterally and symmetrically.  PP exam is normal bilateral   Cerebellar: The cerebellar examination is normal to finger to nose test.    Review of Diagnostics:  I personally reviewed and interpreted the " "following:    MR Thoracic Spine w/o & w Contrast    Result Date: 12/13/2024      IMPRESSION: 1.  Normal MRI of thoracic spine.    MR Cervical Spine w/o & w Contrast    Result Date: 12/13/2024      IMPRESSION: 1.  Normal MRI of the clinical spine within limits of a mildly motion compromised exam.     MR Lumbar Spine w/o & w Contrast    Result Date: 12/11/2024  .     IMPRESSION:  Unremarkable lumbar spine MRI. No significant degenerative changes. No spinal canal or neural foraminal narrowing at any level. No abnormal enhancement appreciated of the cauda equina nerve roots.       No results found for this or any previous visit (from the past 24 hours).    Vitamin B12:   Lab Results   Component Value Date    B12 <150 12/04/2024         Complete Blood Count:    Recent Labs   Lab Test 12/12/24  0559 12/11/24  1215 12/04/24  1511 05/26/24  1551   WBC 5.0 8.1 5.8 9.8   RBC 4.06 4.35 4.36 4.74   HGB 10.8* 11.2* 11.3* 10.2*   HCT 33.0* 35.6 35.1 33.3*    265 287 272   LYMPH  --  34 34 34        HgA1c: No results found for: \"A1C\"     Thyroid Stimulating Hormone:   Lab Results   Component Value Date    TSH 0.86 12/12/2024        Recent Labs   Lab Test 12/12/24  0559 12/11/24  1215   WBC 5.0 8.1   HGB 10.8* 11.2*   MCV 81 82    265      Recent Labs   Lab Test 12/12/24  0559 12/12/24  0219 12/11/24  1215     --  136   POTASSIUM 4.2  --  4.0   CHLORIDE 107  --  100   CO2 24  --  23   BUN 8.2  --  8.8   CR 0.63  --  0.61   ANIONGAP 11  --  13   TUCKER 8.7*  --  9.2   GLC 93 100* 92     CMP:  Recent Labs   Lab 12/11/24  1215   PROTTOTAL 7.9   ALBUMIN 4.2   ALKPHOS 60   AST 19   ALT 20   BILITOTAL 0.2         Impression:  This is a 29 year old woman who presents with subacute progressive  bilateral predominantly proximal  lower extremities  weakness, loss of proprioception. Based on exam concern for possible spinal cord process (could be subacute combined degeneration from recently determined vitamin B12 deficiency " superimposed on zinc def and vitamin D def .  Patient also noted to have iron deficiency.  Low CSF proteins rules out GBS     MRI cervical and thoracic spine and lumbar spine w/wo kamila  were normal.   -pending additional blood work  -EMG when able as outpatient   -patient treated with B12 as subcutaneous injection daily for a week then weekly  Zinc level low 37.1: Start zinc 50 mg daily   Vitamin D level low 16: Start vitamin D 5000 mg daily      Continue Physical therapy     Talked to the primary team   Talked top her nurse     Will follow along with you while inpatient     Total time of visit: 50 minutes with the time spent on chart review, imaging and lab results review, and more than 50 % of the time spent on coordination of cares and face-to-face time with the patient including counseling regarding pathophysiology of the above conditions, results of the tests and further directions of care.   Lester Costello MD  Neurology      Thank you very much  for allowing me to participate in the care of this patient.

## 2024-12-14 NOTE — PLAN OF CARE
"End of Shift Summary  For vital signs and complete assessments, please see documentation flowsheets.     Pertinent assessments: Alert and oriented, alarms on for safety, denies pain/nausea.,  numbness to bilateral lower extremities about the same as admit, lung sounds clear, bowel sounds hypo    Major Shift Events : MRI results are negative, 24hr urine collection started 12/13/2024 at 0813, pt voiced frustration with lack of \"treatment\" and asking to leave AMA--explained to pt that there are multiple tests pending to properly diagnose and treat     Treatment Plan: Neuro following. Awaiting further workup results for proper diagnosis and treatment     Bedside Nurse: Claudia Sanchez RN       Problem: Adult Inpatient Plan of Care  Goal: Plan of Care Review  Description: The Plan of Care Review/Shift note should be completed every shift.  The Outcome Evaluation is a brief statement about your assessment that the patient is improving, declining, or no change.  This information will be displayed automatically on your shift  note.  Outcome: Not Progressing  Flowsheets (Taken 12/14/2024 0542)  Outcome Evaluation: awiting test results  Plan of Care Reviewed With: patient  Overall Patient Progress: no change  Goal: Patient-Specific Goal (Individualized)  Description: You can add care plan individualizations to a care plan. Examples of Individualization might be:  \"Parent requests to be called daily at 9am for status\", \"I have a hard time hearing out of my right ear\", or \"Do not touch me to wake me up as it startles  me\".  Outcome: Not Progressing  Goal: Absence of Hospital-Acquired Illness or Injury  Outcome: Not Progressing  Intervention: Identify and Manage Fall Risk  Recent Flowsheet Documentation  Taken 12/13/2024 2220 by Claudia Sanchez, RN  Safety Promotion/Fall Prevention:   activity supervised   lighting adjusted   supervised activity  Intervention: Prevent Skin Injury  Recent Flowsheet Documentation  Taken 12/13/2024 " 2220 by Claudia Sanchez, RN  Body Position: position changed independently  Intervention: Prevent Infection  Recent Flowsheet Documentation  Taken 12/13/2024 2220 by Claudia Sanchez, RN  Infection Prevention:   single patient room provided   rest/sleep promoted   hand hygiene promoted   cohorting utilized  Goal: Optimal Comfort and Wellbeing  Outcome: Not Progressing  Goal: Readiness for Transition of Care  Outcome: Not Progressing   Goal Outcome Evaluation:      Plan of Care Reviewed With: patient    Overall Patient Progress: no changeOverall Patient Progress: no change    Outcome Evaluation: awiting test results

## 2024-12-14 NOTE — PROGRESS NOTES
[facility]    Medicine Progress Note - Hospitalist Service    Date of Admission:  12/11/2024    Assessment & Plan     Alycia Noe is a 29 year old female admitted on 12/11/2024. She has a past medical history significant for tobacco use disorder.  She presented to the [facility] emergency room on 12/11/2024 due to 1 month history of progressive bilateral lower extremity weakness and numbness.  Weakness and numbness now go from her feet all the way up to the upper thigh area on both legs.  Having difficulty walking because of weakness and numbness.  Finished a course of amoxicillin for suspected pneumonia the day prior to admission.  At this point she denies any respiratory symptoms.    Here she was hemodynamically stable.  Lab workup was notable for mild anemia with a hemoglobin of 11.2 with grossly normal kidney function and electrolytes, white blood count, glucose, negative pregnancy test.  Urine analysis is abnormal with large blood, small leukocyte esterase and 28 white cells.  COVID-19, influenza and RSV testing was negative.  Lumbar puncture performed in the ER was negative for evidence of infection and protein was actually somewhat low at 13.7.  Fluid was clear with only 1 nucleated cell and glucose of 60.  M MRI cervical, lumbar, thoracic spine are unremarkable      Regarding her somewhat abnormal urine sample watching off antibiotics and awaiting urine culture results.    She is B12 deficient and this is being replaced.  Also noted to have multiple nutrient deficiency including zinc, vitamin D, and iron.  Suspect if she is having malabsorption.  Will check tissue transglutaminase.  Neurology following, appreciated assistance.     Patient fell in the bathroom. Reported that her legs couldn't tolerate her weight, no lightheadedness or palpitation prior to fall, did not pass out, did not hit her head. It was witness as she was with her RN and did not follow the instructions. She  was seen at bedside. She denied any pain in her legs, did not report if her hip, ankle or knee joints hurting, exam without any bruise or other signs of trauma. Unable to assess full ROM at hip and knee joint given weakness in legs and thighs.          Progressive bilateral lower extremity weakness and numbness.  -Symptoms present for 1 month.  -Have progressed to the point of being all the way up to upper thigh area bilaterally.  -Recently completed course of amoxicillin for suspected pneumonia.  -Denies any diarrhea over the past month.  -MRI cervical, lumbar, thoracic were done and overall reassuring  -Neurology consulted, stated assistance.  -PT consulted and recommended ARU which patient refused  -Replacing B12, neurology recommended starting daily subcu vitamin B12 injections for 7 days followed by weekly injections.  -EMG  outpatient  -Lyme, treponema antibody, HIV all negative    B12 deficiency  Vitamin D deficiency  Zinc deficiency  Iron deficiency with anemia  Patient is having multiple nutrient deficiencies.  Wondering if she is having malabsorption.  Will start patient on supplementation.  Vitamin B-12 is less than 150, iron saturation index is only at 6, vitamin D is at 16, zinc is at 37.1.   -Vitamin B12 replacement as above  - Start patient on zinc sulfate 220 mg daily  - Start patient on vitamin D3 50 mcg daily  - Start patient on ferrous sulfate 330  -Check tissue transglutaminase       Tobacco use disorder.  -advise complete smoking cessation.  -Nicotine gum if needed.     Anemia: Normocytic to borderline microcytic.  Doubt directly related to B12 as a result  -Mild.  Hemoglobin 11.2 upon presentation  -B12 and iron replacement as above, monitor hemoglobin     Diet: Combination Diet Regular Diet Adult    DVT Prophylaxis: Pneumatic Compression Devices  Gonzalez Catheter: Not present  Lines: None     Cardiac Monitoring: None  Code Status: Full Code      Clinically Significant Risk Factors                 "              # Obesity: Estimated body mass index is 30 kg/m  as calculated from the following:    Height as of this encounter: 1.676 m (5' 6\").    Weight as of this encounter: 84.3 kg (185 lb 13.6 oz)., PRESENT ON ADMISSION            Social Drivers of Health     [facility]    Depression (PHQ-9)          Disposition Plan     Medically Ready for Discharge: Anticipated Tomorrow       This document was created using voice recognition technology.  Please excuse any typographical errors that may have occurred.  Please call with any questions.         Cami Bell MD  Hospitalist Service  [facility]  Securely message with [employer] (more info)  Text page via [facility] Paging/Directory   ______________________________________________________________________    Interval History     Patient is frustrated and threatened to leave AMA however she is not even able to stand up.  Extensively counseled patient and recommended that she should stay in the hospital.  Neurology was also able to see patient today and made further recommendations about nutrient replacement.  Physical therapy recommended ARU but patient refused.  No pain in the lower extremities, sensations grossly intact, only weakness is there which is profound.  4 point review of systems otherwise negative    Physical Exam   Vital Signs: Temp: 97.8  F (36.6  C) Temp src: Oral BP: 130/74 Pulse: 86   Resp: 16 SpO2: 96 % O2 Device: None (Room air)    Weight: 185 lbs 13.56 oz    General: Alert, awake, no acute distress.  HEENT: NC/AT, eyes anicteric, external occular movements intact, face symmetric.   Cardiac: RRR, S1, S2.  No murmurs appreciated.  Pulmonary: Normal chest rise, normal work of breathing.  Lungs CTA BL  Abdomen: soft, non-tender, non-distended.  Bowel Sounds Present.  No guarding.  Extremities: no deformities.  Warm, well perfused.  Skin: no rashes or lesions noted.  Warm " and Dry.  Neuro:  Speech clear.  Upper extremity coordination and strength grossly normal.  Sensations grossly intact, strength bilateral lower extremities 2 out of 5.  Psych: Appropriate affect.      Medical Decision Making             Data

## 2024-12-15 ENCOUNTER — APPOINTMENT (OUTPATIENT)
Dept: PHYSICAL THERAPY | Facility: CLINIC | Age: 29
End: 2024-12-15
Payer: COMMERCIAL

## 2024-12-15 VITALS
HEART RATE: 69 BPM | DIASTOLIC BLOOD PRESSURE: 61 MMHG | BODY MASS INDEX: 29.87 KG/M2 | SYSTOLIC BLOOD PRESSURE: 108 MMHG | TEMPERATURE: 98.4 F | HEIGHT: 66 IN | OXYGEN SATURATION: 100 % | RESPIRATION RATE: 16 BRPM | WEIGHT: 185.85 LBS

## 2024-12-15 PROCEDURE — 250N000011 HC RX IP 250 OP 636: Performed by: STUDENT IN AN ORGANIZED HEALTH CARE EDUCATION/TRAINING PROGRAM

## 2024-12-15 PROCEDURE — 97530 THERAPEUTIC ACTIVITIES: CPT | Mod: GP | Performed by: PHYSICAL THERAPIST

## 2024-12-15 PROCEDURE — 97116 GAIT TRAINING THERAPY: CPT | Mod: GP | Performed by: PHYSICAL THERAPIST

## 2024-12-15 PROCEDURE — 99239 HOSP IP/OBS DSCHRG MGMT >30: CPT | Performed by: STUDENT IN AN ORGANIZED HEALTH CARE EDUCATION/TRAINING PROGRAM

## 2024-12-15 PROCEDURE — 250N000013 HC RX MED GY IP 250 OP 250 PS 637: Performed by: PSYCHIATRY & NEUROLOGY

## 2024-12-15 PROCEDURE — 250N000013 HC RX MED GY IP 250 OP 250 PS 637: Performed by: STUDENT IN AN ORGANIZED HEALTH CARE EDUCATION/TRAINING PROGRAM

## 2024-12-15 PROCEDURE — 250N000013 HC RX MED GY IP 250 OP 250 PS 637: Performed by: INTERNAL MEDICINE

## 2024-12-15 RX ORDER — PREGABALIN 25 MG/1
25 CAPSULE ORAL 3 TIMES DAILY
Qty: 9 CAPSULE | Refills: 0 | Status: SHIPPED | OUTPATIENT
Start: 2024-12-15 | End: 2024-12-18

## 2024-12-15 RX ORDER — VITAMIN B COMPLEX
50 TABLET ORAL DAILY
Qty: 60 TABLET | Refills: 0 | Status: SHIPPED | OUTPATIENT
Start: 2024-12-16 | End: 2025-01-15

## 2024-12-15 RX ORDER — FERROUS SULFATE 325(65) MG
325 TABLET ORAL EVERY OTHER DAY
Qty: 15 TABLET | Refills: 0 | Status: SHIPPED | OUTPATIENT
Start: 2024-12-16 | End: 2025-01-15

## 2024-12-15 RX ORDER — CYANOCOBALAMIN 1000 UG/ML
1000 INJECTION, SOLUTION INTRAMUSCULAR; SUBCUTANEOUS DAILY
Qty: 6 ML | Refills: 0 | Status: SHIPPED | OUTPATIENT
Start: 2024-12-15 | End: 2024-12-21

## 2024-12-15 RX ORDER — ZINC SULFATE 50(220)MG
220 CAPSULE ORAL DAILY
Qty: 30 CAPSULE | Refills: 0 | Status: SHIPPED | OUTPATIENT
Start: 2024-12-16 | End: 2025-01-15

## 2024-12-15 RX ORDER — CYANOCOBALAMIN 1000 UG/ML
1000 INJECTION, SOLUTION INTRAMUSCULAR; SUBCUTANEOUS
Qty: 3 ML | Refills: 0 | Status: SHIPPED | OUTPATIENT
Start: 2024-12-27 | End: 2025-01-11

## 2024-12-15 RX ADMIN — PREGABALIN 25 MG: 25 CAPSULE ORAL at 15:32

## 2024-12-15 RX ADMIN — Medication 50 MCG: at 08:41

## 2024-12-15 RX ADMIN — CYANOCOBALAMIN 1000 MCG: 1000 INJECTION, SOLUTION INTRAMUSCULAR; SUBCUTANEOUS at 11:01

## 2024-12-15 RX ADMIN — ZINC SULFATE 220 MG (50 MG) CAPSULE 220 MG: CAPSULE at 08:42

## 2024-12-15 RX ADMIN — PREGABALIN 25 MG: 25 CAPSULE ORAL at 08:41

## 2024-12-15 RX ADMIN — Medication 1 TABLET: at 08:41

## 2024-12-15 ASSESSMENT — ACTIVITIES OF DAILY LIVING (ADL)
ADLS_ACUITY_SCORE: 49

## 2024-12-15 NOTE — PLAN OF CARE
"Goal Outcome Evaluation:      Plan of Care Reviewed With: patient    Overall Patient Progress: no changeOverall Patient Progress: no change    Patient A&Ox4, denied pain. Up with assist of two using the al steady. Reports nimbness on BLE from toes to thighs. Tolerated regular diet, B12 IM given. Discharge plan TBD.  Problem: Adult Inpatient Plan of Care  Goal: Plan of Care Review  Description: The Plan of Care Review/Shift note should be completed every shift.  The Outcome Evaluation is a brief statement about your assessment that the patient is improving, declining, or no change.  This information will be displayed automatically on your shift  note.  Outcome: Progressing  Flowsheets (Taken 12/14/2024 2259)  Plan of Care Reviewed With: patient  Overall Patient Progress: no change  Goal: Patient-Specific Goal (Individualized)  Description: You can add care plan individualizations to a care plan. Examples of Individualization might be:  \"Parent requests to be called daily at 9am for status\", \"I have a hard time hearing out of my right ear\", or \"Do not touch me to wake me up as it startles  me\".  Outcome: Progressing  Goal: Absence of Hospital-Acquired Illness or Injury  Outcome: Progressing  Intervention: Identify and Manage Fall Risk  Recent Flowsheet Documentation  Taken 12/14/2024 2040 by Charlette Araujo, RN  Safety Promotion/Fall Prevention:   activity supervised   assistive device/personal items within reach   clutter free environment maintained   nonskid shoes/slippers when out of bed  Intervention: Prevent Skin Injury  Recent Flowsheet Documentation  Taken 12/14/2024 2040 by Charlette Araujo, RN  Body Position: position changed independently  Intervention: Prevent and Manage VTE (Venous Thromboembolism) Risk  Recent Flowsheet Documentation  Taken 12/14/2024 2040 by Charlette Araujo, RN  VTE Prevention/Management: SCDs off (sequential compression devices)  Goal: Optimal Comfort and Wellbeing  Outcome: " Progressing  Goal: Readiness for Transition of Care  Outcome: Progressing     Problem: Fall Injury Risk  Goal: Absence of Fall and Fall-Related Injury  Outcome: Progressing  Intervention: Identify and Manage Contributors  Recent Flowsheet Documentation  Taken 12/14/2024 2040 by Charlette Araujo RN  Medication Review/Management: medications reviewed  Intervention: Promote Injury-Free Environment  Recent Flowsheet Documentation  Taken 12/14/2024 2040 by Charlette Araujo, RN  Safety Promotion/Fall Prevention:   activity supervised   assistive device/personal items within reach   clutter free environment maintained   nonskid shoes/slippers when out of bed     Problem: Pain Acute  Goal: Optimal Pain Control and Function  Outcome: Progressing  Intervention: Prevent or Manage Pain  Recent Flowsheet Documentation  Taken 12/14/2024 2040 by Charlette Araujo RN  Medication Review/Management: medications reviewed     Problem: Skin Injury Risk Increased  Goal: Skin Health and Integrity  Outcome: Progressing  Intervention: Optimize Skin Protection  Recent Flowsheet Documentation  Taken 12/14/2024 2040 by Charlette Araujo, RN  Activity Management:   activity adjusted per tolerance   activity encouraged  Head of Bed (HOB) Positioning: HOB at 20 degrees

## 2024-12-15 NOTE — DISCHARGE SUMMARY
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    Weight as of this encounter: 84.3 kg (185 lb 13.6 oz).       Follow-ups Needed After Discharge   Follow-up Appointments       Follow-up and recommended labs and tests       Follow up with primary care provider, Physician No Ref-Primary, within 7 days for hospital follow- up.  The following labs/tests are recommended: further work up for luzmaria nutrient deficiency.                Unresulted Labs Ordered in the Past 30 Days of this Admission       Date and Time Order Name Status Description    12/14/2024  5:36 PM Tissue transglutaminase jagjit IgA and IgG In process     12/13/2024  6:19 PM Heavy Metals Urine with Reflex to Arsenic Fractionated Urine In process     12/13/2024  6:19 PM Blood metal panel In process     12/12/2024 10:43 PM Vitamin B1 plasma In process     12/12/2024 10:43 PM Vitamin E In process     12/12/2024 10:43 PM Protein immunofixation urine In process     12/12/2024 10:43 PM Creatine Kinase Isoenzymes In process     12/11/2024  3:14 PM Cerebrospinal fluid Aerobic Bacterial Culture Routine With Gram Stain Preliminary           Discharge Disposition   Discharged to home  Condition at discharge: Fair    Hospital Course   Alycia Noe is a 29 year old female admitted on 12/11/2024. She has a past medical history significant for tobacco use " jeremiah presented to the ER with complaint of 1 month history of progressive bilateral lower extremity weakness and numbness.  Problems were addressed this hospitalization.      Bilateral lower extremities weakness and numbness-improving  Concerns for subacute combined degeneration of spinal cord   She underwent extensive workup.  Her test results showed hemoglobin 10-11, iron level 18, iron saturation index 6, iron binding capacity 306, vitamin D is at 16, zinc is at 37, vitamin B12 less than 150.  She underwent LP.  LP revealed glucose of 60, protein 13.7.  Agree for oligoclonal banding.  Her meningitis/encephalitis panel was negative.  Also tested negative for COVID, enterovirus, human herpes virus, RSV and VZV.  He also tested negative for Lyme, and treponema.  Underwent MRI of cervical, thoracic, and lumbar spine were all overall unremarkable.  Neurology consulted, appreciated recommendations.  Per neurology her bilateral lower extremity weakness and numbness either could be due to subacute combined degeneration of the spinal cord secondary to vitamin B12 deficiency versus psychogenic.  She was a started on vitamin B12 subcu injections in the hospital.  Neurology recommended 1000 mcg daily injections for 7 days then weekly.  Prior to discharge she noted that her numbness has started to improved distally, she continues to have numbness in bilateral anterior thighs.  Her  strength upon my exam was 5 out of 5 at ankle joint, 3 out of 5 at knee and hip joints.    PT/OT were consulted, appreciated recommendations.  Initial recommendations was to go to the acute rehab later going to the transitional care unit.  Patient refused going to anywhere except her home.  She also does not want a stay in the hospital.  Extensively counseled the patient that she should go to the rehab so she can work with the therapy as it is not safe for her to go home.  She lives at home with her mom and 2 younger kids.  She says that she  will be fine at home as she has her brothers coming home as well and will be helping her.  She is yet not been able to stand or support herself and requiring assistance however she is not amenable to go to the rehab.  She is alert and oriented, able to verbalize my recommendation, able to understand and verbalize the risk of going home.  She appears decisional.  Given patient's autonomy and also I have no other medical reason to keep her here in the hospital will discharge her home as per her wishes.  Return precautions were discussed. Care management following and working to set up home health PT and OT for the patient, appreciate their assistance.      B12 deficiency  Vitamin D deficiency  Zinc deficiency  Iron deficiency with anemia  Patient is having multiple nutrient deficiencies.  Wondering if she is having malabsorption.  Will start patient on supplementation.  Vitamin B-12 is less than 150, iron saturation index is only at 6, vitamin D is at 16, zinc is at 37.1.  No menorrhagia.  No family history of colon cancer.  Denied any black-colored stool.  -Vitamin B12 replacement as above  - Start patient on zinc sulfate 220 mg daily  - Start patient on vitamin D3 50 mcg daily  - Start patient on ferrous sulfate 330  - Transglutaminase levels were checked and were pending at the time of discharge.  Defer further evaluation to PCP        Primary care  Patient has established primary care.  Her appointment is scheduled for 12/31.  She refused to set up a sooner appointment or refused any assistance in this regard.        Consultations This Hospital Stay   NEUROLOGY IP CONSULT  PHYSICAL THERAPY ADULT IP CONSULT  CARE MANAGEMENT / SOCIAL WORK IP CONSULT    Code Status   Full Code    Time Spent on this Encounter   I, Cami Bell MD, personally saw the patient today and spent greater than 30 minutes discharging this patient.       Cami Bell MD  [facility] [facility] [address] [facility]  [address] [place]  [place] [address]  Phone: [phone]  Fax: [phone]  ______________________________________________________________________    Physical Exam   Vital Signs: Temp: 98.4  F (36.9  C) Temp src: Oral BP: 108/61 Pulse: 69   Resp: 16 SpO2: 100 % O2 Device: None (Room air)    Weight: 185 lbs 13.56 oz    General: Alert, awake, no acute distress.  HEENT: NC/AT, eyes anicteric, external occular movements intact, face symmetric.   Cardiac: RRR, S1, S2.  No murmurs appreciated.  Pulmonary: Normal chest rise, normal work of breathing.  Lungs CTA BL  Abdomen: soft, non-tender, non-distended.  Bowel Sounds Present.  No guarding.  Extremities: no deformities.  Warm, well perfused.  Skin: no rashes or lesions noted.  Warm and Dry.  Neuro:  Speech clear.  Upper extremity coordination and strength grossly normal.  Bilateral upper extremities 5 out of 5, bilateral lower extremities 5 out of 5 at ankle joint, 3 out of 5 at both knee and hip joint.  Psych: Appropriate affect.       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  The following labs/tests are recommended: further work up for luzmaria nutrient deficiency.     Activity    Your activity upon discharge: activity as tolerated     Walker Order for DME - ONLY FOR DME     Diet    Follow this diet upon discharge: Current Diet:Orders Placed This Encounter      Combination Diet Regular Diet Adult       Significant Results and Procedures   Most Recent 3 CBC's:  Recent Labs   Lab Test 12/12/24  0559 12/11/24  1215 12/04/24  1511   WBC 5.0 8.1 5.8    HGB 10.8* 11.2* 11.3*   MCV 81 82 81    265 287     Most Recent 3 BMP's:  Recent Labs   Lab Test 12/12/24  0559 12/12/24  0219 12/11/24  1215 12/04/24  1511     --  136 138   POTASSIUM 4.2  --  4.0 4.7   CHLORIDE 107  --  100 108   CO2 24  --  23 28   BUN 8.2  --  8.8 12   CR 0.63  --  0.61 0.70   ANIONGAP 11  --  13 2*   TUCKER 8.7*  --  9.2 9.1   GLC 93 100* 92 97       Discharge Medications   Current Discharge Medication List        START taking these medications    Details   !! cyanocobalamin (CYANOCOBALAMIN) 1000 mcg/mL injection Inject 1 mL (1,000 mcg) subcutaneously daily for 6 days.  Qty: 6 mL, Refills: 0    Associated Diagnoses: Vitamin B12 deficiency (non anemic)      !! cyanocobalamin (CYANOCOBALAMIN) 1000 mcg/mL injection Inject 1 mL (1,000 mcg) subcutaneously every 7 days for 3 doses.  Qty: 3 mL, Refills: 0    Associated Diagnoses: Bilateral leg weakness      ferrous sulfate (FEROSUL) 325 (65 Fe) MG tablet Take 1 tablet (325 mg) by mouth every other day.  Qty: 15 tablet, Refills: 0    Associated Diagnoses: Other iron deficiency anemia      pregabalin (LYRICA) 25 MG capsule Take 1 capsule (25 mg) by mouth 3 times daily for 3 days.  Qty: 9 capsule, Refills: 0    Associated Diagnoses: Bilateral leg numbness      Vitamin D3 (CHOLECALCIFEROL) 25 mcg (1000 units) tablet Take 2 tablets (50 mcg) by mouth daily.  Qty: 60 tablet, Refills: 0    Associated Diagnoses: Vitamin D deficiency      zinc sulfate (ZINCATE) 220 (50 Zn) MG capsule Take 1 capsule (220 mg) by mouth daily.  Qty: 30 capsule, Refills: 0    Associated Diagnoses: Zinc deficiency       !! - Potential duplicate medications found. Please discuss with provider.        CONTINUE these medications which have NOT CHANGED    Details   multivitamin w/minerals (THERA-VIT-M) tablet Take 1 tablet by mouth daily.           STOP taking these medications       amoxicillin (AMOXIL) 500 MG capsule Comments:   Reason for Stopping:         cyanocobalamin  (VITAMIN B-12) 500 MCG tablet Comments:   Reason for Stopping:             Allergies   No Known Allergies

## 2024-12-15 NOTE — PROGRESS NOTES
Care Management Discharge Note    Discharge Date: 12/15/2024       Discharge Disposition:  Home, possible home care  Discharge Transportation: family or friend will provide    Patient/family educated on Medicare website which has current facility and service quality ratings: yes    Education Provided on the Discharge Plan: Yes  Persons Notified of Discharge Plans: patient, hospitalist    Handoff Referral Completed: No, handoff not indicated or clinically appropriate    Additional Information:  Discharge order in place and signed today.     PT changed discharge recs to TCU. CM met w patient at the bedside and shared this rec, she stated she is not interested in TCU or ARU for discharge placement. She did confirm interest in home care PT/OT services and was agreeable to CM submitting a referral. CM shared w her that she likely would not be able to establish home care services until she has an appt to establish primary care, she verbalized understanding. A PCP appt has been scheduled for 12/31. CM offered to assist w attempting to schedule a sooner appt and patient declined, stating she prefers to keep the 12/31 appt.    Patient declined any additional needs for discharge.     Bisi Hunt, RN, BSN  Inpatient Care Coordination  [facility]  [phone]

## 2024-12-15 NOTE — PLAN OF CARE
"Goal Outcome Evaluation:      VSS on RA, A&Ox4. Ambulated with PT in hallway with walker and GB. Still having numbness and weakness in legs. Discharge education completed and patient voiced understanding. Will be discharging home to self care with family. Medications sent with patient, instructions on how to give self injections completed.     Plan of Care Reviewed With: patient    Overall Patient Progress: improvingOverall Patient Progress: improving    Outcome Evaluation: Assist x1-2 with GB and W. VSS. Discharge education complete, paient discharging home per request.    Problem: Adult Inpatient Plan of Care  Goal: Plan of Care Review  Description: The Plan of Care Review/Shift note should be completed every shift.  The Outcome Evaluation is a brief statement about your assessment that the patient is improving, declining, or no change.  This information will be displayed automatically on your shift  note.  Outcome: Adequate for Care Transition  Flowsheets (Taken 12/15/2024 1594)  Outcome Evaluation: Assist x1-2 with GB and W. VSS. Discharge education complete, paient discharging home per request.  Plan of Care Reviewed With: patient  Overall Patient Progress: improving  Goal: Patient-Specific Goal (Individualized)  Description: You can add care plan individualizations to a care plan. Examples of Individualization might be:  \"Parent requests to be called daily at 9am for status\", \"I have a hard time hearing out of my right ear\", or \"Do not touch me to wake me up as it startles  me\".  Outcome: Adequate for Care Transition  Goal: Absence of Hospital-Acquired Illness or Injury  Outcome: Adequate for Care Transition  Intervention: Identify and Manage Fall Risk  Recent Flowsheet Documentation  Taken 12/15/2024 7898 by Kallie Juarez, RN  Safety Promotion/Fall Prevention:   activity supervised   assistive device/personal items within reach   clutter free environment maintained   nonskid shoes/slippers when out of " bed  Intervention: Prevent Skin Injury  Recent Flowsheet Documentation  Taken 12/15/2024 0850 by Kallie Juarez RN  Body Position: position changed independently  Intervention: Prevent and Manage VTE (Venous Thromboembolism) Risk  Recent Flowsheet Documentation  Taken 12/15/2024 0850 by Kallie Juarez RN  VTE Prevention/Management: SCDs off (sequential compression devices)  Goal: Optimal Comfort and Wellbeing  Outcome: Adequate for Care Transition  Goal: Readiness for Transition of Care  Outcome: Adequate for Care Transition     Problem: Fall Injury Risk  Goal: Absence of Fall and Fall-Related Injury  Outcome: Adequate for Care Transition  Intervention: Identify and Manage Contributors  Recent Flowsheet Documentation  Taken 12/15/2024 0850 by Kallie Juarez RN  Medication Review/Management: medications reviewed  Intervention: Promote Injury-Free Environment  Recent Flowsheet Documentation  Taken 12/15/2024 0850 by Kallie Juarez RN  Safety Promotion/Fall Prevention:   activity supervised   assistive device/personal items within reach   clutter free environment maintained   nonskid shoes/slippers when out of bed     Problem: Pain Acute  Goal: Optimal Pain Control and Function  Outcome: Adequate for Care Transition  Intervention: Prevent or Manage Pain  Recent Flowsheet Documentation  Taken 12/15/2024 0850 by Kallie Juarez RN  Medication Review/Management: medications reviewed     Problem: Skin Injury Risk Increased  Goal: Skin Health and Integrity  Outcome: Adequate for Care Transition  Intervention: Plan: Nurse Driven Intervention: Moisture Management  Recent Flowsheet Documentation  Taken 12/15/2024 0850 by Kallie Juarez RN  Moisture Interventions: Encourage regular toileting  Intervention: Optimize Skin Protection  Recent Flowsheet Documentation  Taken 12/15/2024 1200 by Kallie Juarez RN  Activity Management: ambulated outside room  Taken 12/15/2024 0850 by Kallie Juarez RN  Activity Management:    activity adjusted per tolerance   activity encouraged  Head of Bed (HOB) Positioning: HOB at 20 degrees

## 2024-12-15 NOTE — DISCHARGE INSTRUCTIONS
"An appointment to establish primary care has been scheduled for you per your request:   Dec 31, 2024 9:30 AM  (Arrive by 9:10 AM)  New ED/Hospital Follow Up with Rafa Granda MD  [facility] ([facility] ) [address] [id number] [phone]   Please plan to arrive at the clinic at your \"Arrive By\" time for your appointment. Our late policy will be enforced based on this time.     "

## 2024-12-15 NOTE — PLAN OF CARE
"Goal Outcome Evaluation:      Plan of Care Reviewed With: patient    End of Shift Summary  For vital signs and complete assessments, please see documentation flowsheets.     Pertinent assessments: A&O x4. Denies pain. BLE numbness up to pt thigh/hip. Tolerating diet. Transfers with Ax2 with al robertson. Pt refuses assistance at times, reports frustration with care, refuses assistance and , gait belt at times and does not follow commands for safe transfers/cares at times. Tolerating diet, denies N/V. Showered.     Major Shift Events : Fall without injury- see previous note. Neuro saw patient. Refusing IV access.    Treatment Plan: Neuro following. Awaiting further workup results for proper diagnosis and treatment         Problem: Adult Inpatient Plan of Care  Goal: Plan of Care Review  Description: The Plan of Care Review/Shift note should be completed every shift.  The Outcome Evaluation is a brief statement about your assessment that the patient is improving, declining, or no change.  This information will be displayed automatically on your shift  note.  Outcome: Not Progressing  Flowsheets (Taken 12/14/2024 7578)  Plan of Care Reviewed With: patient  Goal: Patient-Specific Goal (Individualized)  Description: You can add care plan individualizations to a care plan. Examples of Individualization might be:  \"Parent requests to be called daily at 9am for status\", \"I have a hard time hearing out of my right ear\", or \"Do not touch me to wake me up as it startles  me\".  Outcome: Not Progressing  Goal: Absence of Hospital-Acquired Illness or Injury  Outcome: Not Progressing  Intervention: Identify and Manage Fall Risk  Recent Flowsheet Documentation  Taken 12/14/2024 1015 by Radha Murrieta, RN  Safety Promotion/Fall Prevention: activity supervised  Intervention: Prevent Skin Injury  Recent Flowsheet Documentation  Taken 12/14/2024 1015 by Radha Murrieta, RN  Body Position: position changed " independently  Intervention: Prevent Infection  Recent Flowsheet Documentation  Taken 12/14/2024 1015 by Radha Murrieta, RN  Infection Prevention:   rest/sleep promoted   single patient room provided  Goal: Optimal Comfort and Wellbeing  Outcome: Not Progressing  Goal: Readiness for Transition of Care  Outcome: Not Progressing     Problem: Fall Injury Risk  Goal: Absence of Fall and Fall-Related Injury  Outcome: Not Progressing  Intervention: Promote Injury-Free Environment  Recent Flowsheet Documentation  Taken 12/14/2024 1015 by Radha Murrieta, RN  Safety Promotion/Fall Prevention: activity supervised     Problem: Pain Acute  Goal: Optimal Pain Control and Function  Outcome: Not Progressing     Problem: Skin Injury Risk Increased  Goal: Skin Health and Integrity  Outcome: Not Progressing  Intervention: Plan: Nurse Driven Intervention: Moisture Management  Recent Flowsheet Documentation  Taken 12/14/2024 1847 by Radha Murrieta RN  Bathing/Skin Care:   shower   linen changed  Taken 12/14/2024 1015 by Radha Murrieta, RN  Moisture Interventions: Encourage regular toileting  Intervention: Optimize Skin Protection  Recent Flowsheet Documentation  Taken 12/14/2024 1544 by Radha Murrieta, RN  Activity Management: activity adjusted per tolerance  Taken 12/14/2024 1015 by Radha Murrieta, RN  Activity Management: activity adjusted per tolerance  Head of Bed (HOB) Positioning: HOB at 20-30 degrees

## 2024-12-15 NOTE — PLAN OF CARE
"For vital signs and complete assessments, please see documentation flowsheets.     [id number]  Pertinent assessments: Pt A&Ox4. Ax2 with Constance steady. On RA. Afebrile. VSS. Denies pain, nausea, and SOB. No IV access.    Major Shift Events: none    Treatment Plan: Symptom management. Expected discharge 12/15.    Bedside Nurse: Sukh Andujar RN     Problem: Adult Inpatient Plan of Care  Goal: Plan of Care Review  Description: The Plan of Care Review/Shift note should be completed every shift.  The Outcome Evaluation is a brief statement about your assessment that the patient is improving, declining, or no change.  This information will be displayed automatically on your shift  note.  Outcome: Not Progressing  Flowsheets (Taken 12/15/2024 5964)  Outcome Evaluation: Ax2 with Constance steady. VSS. No IV access  Plan of Care Reviewed With: patient  Overall Patient Progress: no change  Goal: Patient-Specific Goal (Individualized)  Description: You can add care plan individualizations to a care plan. Examples of Individualization might be:  \"Parent requests to be called daily at 9am for status\", \"I have a hard time hearing out of my right ear\", or \"Do not touch me to wake me up as it startles  me\".  Outcome: Not Progressing  Goal: Absence of Hospital-Acquired Illness or Injury  Outcome: Not Progressing  Intervention: Identify and Manage Fall Risk  Recent Flowsheet Documentation  Taken 12/14/2024 2351 by Sukh Andujar RN  Safety Promotion/Fall Prevention:   activity supervised   assistive device/personal items within reach   clutter free environment maintained   increase visualization of patient   patient and family education   room near nurse's station   room organization consistent   safety round/check completed   treat reversible contributory factors  Intervention: Prevent Skin Injury  Recent Flowsheet Documentation  Taken 12/14/2024 2351 by Sukh Andujar RN  Body Position: position changed " independently  Intervention: Prevent Infection  Recent Flowsheet Documentation  Taken 12/14/2024 2351 by Sukh Andujar, RN  Infection Prevention:   cohorting utilized   hand hygiene promoted   rest/sleep promoted   single patient room provided  Goal: Optimal Comfort and Wellbeing  Outcome: Not Progressing  Goal: Readiness for Transition of Care  Outcome: Not Progressing     Problem: Fall Injury Risk  Goal: Absence of Fall and Fall-Related Injury  Outcome: Not Progressing  Intervention: Identify and Manage Contributors  Recent Flowsheet Documentation  Taken 12/14/2024 2351 by Sukh Andujar RN  Medication Review/Management: medications reviewed  Intervention: Promote Injury-Free Environment  Recent Flowsheet Documentation  Taken 12/14/2024 2351 by Sukh Andujar, RN  Safety Promotion/Fall Prevention:   activity supervised   assistive device/personal items within reach   clutter free environment maintained   increase visualization of patient   patient and family education   room near nurse's station   room organization consistent   safety round/check completed   treat reversible contributory factors     Problem: Pain Acute  Goal: Optimal Pain Control and Function  Outcome: Not Progressing  Intervention: Prevent or Manage Pain  Recent Flowsheet Documentation  Taken 12/14/2024 2351 by Sukh Andujar RN  Medication Review/Management: medications reviewed     Problem: Skin Injury Risk Increased  Goal: Skin Health and Integrity  Outcome: Not Progressing  Intervention: Optimize Skin Protection  Recent Flowsheet Documentation  Taken 12/14/2024 2351 by Sukh Andujar, RN  Head of Bed (HOB) Positioning: HOB at 20-30 degrees     Goal Outcome Evaluation:      Plan of Care Reviewed With: patient    Overall Patient Progress: no changeOverall Patient Progress: no change    Outcome Evaluation: Ax2 with Constance robertson. VSS. No IV access

## 2024-12-16 LAB
A-TOCOPHEROL VIT E SERPL-MCNC: 6.3 MG/L
ARSENIC BLD-MCNC: <10 UG/L
BACTERIA CSF CULT: NO GROWTH
BETA+GAMMA TOCOPHEROL SERPL-MCNC: 1.3 MG/L
CK BB CFR SERPL ELPH: 0 %
CK MACRO1 CFR SERPL: 0 %
CK MACRO2 CFR SERPL: 0 %
CK MB CFR SERPL ELPH: 0 %
CK MM CFR SERPL ELPH: 100 %
CK SERPL-CCNC: 84 U/L
GRAM STAIN RESULT: NORMAL
GRAM STAIN RESULT: NORMAL
LEAD BLDV-MCNC: <2 UG/DL
LOCATION OF TASK: NORMAL
MERCURY BLD-MCNC: 6.6 UG/L
PROT ELPH PNL UR ELPH: NORMAL

## 2024-12-16 NOTE — PLAN OF CARE
"Physical Therapy Discharge Summary    Reason for therapy discharge:    Discharged to home with home therapy.    Progress towards therapy goal(s). See goals on Care Plan in [facility] electronic health record for goal details.  Goals not met.  Barriers to achieving goals:   discharge from facility.    Therapy recommendation(s):    Continued therapy is recommended.  Rationale/Recommendations:  Per prior PT recommendation, \"Pt is below baseline and very high falls risk.  Not participatory enough in therapy for an appropriate ARU rec at this time (and currently not being followed by OT or SLP).  Rec TCU for strengthening, balance, and mobility.  Per chart, pt is planning on discharging home today, rec HHPT\".        "

## 2024-12-17 LAB
ARSENIC 24H UR-MRATE: ABNORMAL UG/D
ARSENIC UR-MCNC: 18.8 UG/L
ARSENIC/CREAT UR: 22.9 UG/G CRT
CREAT 24H UR-MRATE: ABNORMAL MG/D
CREAT UR-MCNC: 82 MG/DL
LEAD 24H UR-MRATE: ABNORMAL UG/D
LEAD UR-MCNC: <5 UG/L
LEAD/CREAT UR: ABNORMAL UG/G CRT
MERCURY 24H UR-MRATE: ABNORMAL UG/D
MERCURY UR-MCNC: 10 UG/L
MERCURY/CREAT UR: 12.2 UG/G CRT
TTG IGA SER-ACNC: 1.1 U/ML
TTG IGG SER-ACNC: 0.9 U/ML
VIT B1 SERPL-SCNC: 5 NMOL/L

## 2024-12-30 ENCOUNTER — TELEPHONE (OUTPATIENT)
Dept: INTERNAL MEDICINE | Facility: CLINIC | Age: 29
End: 2024-12-30
Payer: COMMERCIAL

## 2024-12-30 NOTE — TELEPHONE ENCOUNTER
Stevan, PT with [facility], calling to report patient is in need of the following DME orders to be placed at tomorrow's OV:  - Hospital Bed  - Manual Wheelchair  - Bedside Commode

## 2024-12-30 NOTE — TELEPHONE ENCOUNTER
Salud OT with [facility] requesting OT evaluation extension for 10 days due to patient cancelling.     Patient still needs to be seen to establish care. Salud asking if provider can review this request after patient has been seen in OV scheduled tomorrow.

## 2025-01-03 ENCOUNTER — MEDICAL CORRESPONDENCE (OUTPATIENT)
Dept: HEALTH INFORMATION MANAGEMENT | Facility: CLINIC | Age: 30
End: 2025-01-03

## 2025-01-08 ENCOUNTER — MEDICAL CORRESPONDENCE (OUTPATIENT)
Dept: HEALTH INFORMATION MANAGEMENT | Facility: CLINIC | Age: 30
End: 2025-01-08
Payer: COMMERCIAL

## 2025-01-17 ENCOUNTER — MEDICAL CORRESPONDENCE (OUTPATIENT)
Dept: HEALTH INFORMATION MANAGEMENT | Facility: CLINIC | Age: 30
End: 2025-01-17
Payer: COMMERCIAL

## 2025-02-27 ENCOUNTER — LAB (OUTPATIENT)
Dept: LAB | Facility: CLINIC | Age: 30
End: 2025-02-27
Payer: COMMERCIAL

## 2025-02-27 DIAGNOSIS — E53.8 VITAMIN B12 DEFICIENCY (NON ANEMIC): ICD-10-CM

## 2025-02-27 DIAGNOSIS — E55.9 VITAMIN D DEFICIENCY: ICD-10-CM

## 2025-02-27 LAB
ALBUMIN SERPL BCG-MCNC: 3.9 G/DL (ref 3.5–5.2)
ALP SERPL-CCNC: 67 U/L (ref 40–150)
ALT SERPL W P-5'-P-CCNC: 14 U/L (ref 0–50)
ANION GAP SERPL CALCULATED.3IONS-SCNC: 8 MMOL/L (ref 7–15)
AST SERPL W P-5'-P-CCNC: 19 U/L (ref 0–45)
BILIRUB SERPL-MCNC: <0.2 MG/DL
BUN SERPL-MCNC: 9 MG/DL (ref 6–20)
CALCIUM SERPL-MCNC: 9.9 MG/DL (ref 8.8–10.4)
CHLORIDE SERPL-SCNC: 104 MMOL/L (ref 98–107)
CREAT SERPL-MCNC: 0.59 MG/DL (ref 0.51–0.95)
EGFRCR SERPLBLD CKD-EPI 2021: >90 ML/MIN/1.73M2
ERYTHROCYTE [DISTWIDTH] IN BLOOD BY AUTOMATED COUNT: 14.6 % (ref 10–15)
GLUCOSE SERPL-MCNC: 93 MG/DL (ref 70–99)
HCO3 SERPL-SCNC: 27 MMOL/L (ref 22–29)
HCT VFR BLD AUTO: 43.2 % (ref 35–47)
HGB BLD-MCNC: 14.3 G/DL (ref 11.7–15.7)
MCH RBC QN AUTO: 28.5 PG (ref 26.5–33)
MCHC RBC AUTO-ENTMCNC: 33.1 G/DL (ref 31.5–36.5)
MCV RBC AUTO: 86 FL (ref 78–100)
PLATELET # BLD AUTO: 215 10E3/UL (ref 150–450)
POTASSIUM SERPL-SCNC: 4.2 MMOL/L (ref 3.4–5.3)
PROT SERPL-MCNC: 7.3 G/DL (ref 6.4–8.3)
RBC # BLD AUTO: 5.02 10E6/UL (ref 3.8–5.2)
SODIUM SERPL-SCNC: 139 MMOL/L (ref 135–145)
VIT B12 SERPL-MCNC: 830 PG/ML (ref 232–1245)
VIT D+METAB SERPL-MCNC: 33 NG/ML (ref 20–50)
WBC # BLD AUTO: 5.6 10E3/UL (ref 4–11)